# Patient Record
Sex: FEMALE | Race: WHITE | NOT HISPANIC OR LATINO | Employment: FULL TIME | URBAN - NONMETROPOLITAN AREA
[De-identification: names, ages, dates, MRNs, and addresses within clinical notes are randomized per-mention and may not be internally consistent; named-entity substitution may affect disease eponyms.]

---

## 2018-07-28 ENCOUNTER — APPOINTMENT (EMERGENCY)
Dept: RADIOLOGY | Facility: HOSPITAL | Age: 48
End: 2018-07-28

## 2018-07-28 ENCOUNTER — HOSPITAL ENCOUNTER (EMERGENCY)
Facility: HOSPITAL | Age: 48
Discharge: HOME | End: 2018-07-28
Admitting: EMERGENCY MEDICINE

## 2018-07-28 ENCOUNTER — APPOINTMENT (EMERGENCY)
Dept: CT IMAGING | Facility: HOSPITAL | Age: 48
End: 2018-07-28

## 2018-07-28 VITALS
RESPIRATION RATE: 18 BRPM | DIASTOLIC BLOOD PRESSURE: 61 MMHG | TEMPERATURE: 98.7 F | SYSTOLIC BLOOD PRESSURE: 132 MMHG | HEART RATE: 63 BPM | OXYGEN SATURATION: 98 % | WEIGHT: 186 LBS

## 2018-07-28 DIAGNOSIS — R07.9 CHEST PAIN: ICD-10-CM

## 2018-07-28 DIAGNOSIS — M54.2 NECK PAIN: Primary | ICD-10-CM

## 2018-07-28 LAB
ALBUMIN SERPL BCP-MCNC: 3.6 G/DL (ref 3.5–5)
ALP SERPL-CCNC: 96 U/L (ref 46–116)
ALT SERPL W P-5'-P-CCNC: 35 U/L (ref 12–78)
ANION GAP SERPL CALCULATED.3IONS-SCNC: 8 MMOL/L (ref 4–13)
APTT PPP: 28 SECONDS (ref 24–36)
AST SERPL W P-5'-P-CCNC: 20 U/L (ref 5–45)
BASOPHILS # BLD AUTO: 0.06 THOUSANDS/ΜL (ref 0–0.1)
BASOPHILS NFR BLD AUTO: 1 % (ref 0–1)
BILIRUB SERPL-MCNC: 0.3 MG/DL (ref 0.2–1)
BUN SERPL-MCNC: 8 MG/DL (ref 5–25)
CALCIUM SERPL-MCNC: 8.8 MG/DL (ref 8.3–10.1)
CHLORIDE SERPL-SCNC: 103 MMOL/L (ref 100–108)
CO2 SERPL-SCNC: 27 MMOL/L (ref 21–32)
CREAT SERPL-MCNC: 0.84 MG/DL (ref 0.6–1.3)
EOSINOPHIL # BLD AUTO: 0.15 THOUSAND/ΜL (ref 0–0.61)
EOSINOPHIL NFR BLD AUTO: 2 % (ref 0–6)
ERYTHROCYTE [DISTWIDTH] IN BLOOD BY AUTOMATED COUNT: 13.4 % (ref 11.6–15.1)
GFR SERPL CREATININE-BSD FRML MDRD: 84 ML/MIN/1.73SQ M
GLUCOSE SERPL-MCNC: 88 MG/DL (ref 65–140)
HCT VFR BLD AUTO: 41.6 % (ref 34.8–46.1)
HGB BLD-MCNC: 14.2 G/DL (ref 11.5–15.4)
INR PPP: 0.98 (ref 0.86–1.17)
LYMPHOCYTES # BLD AUTO: 2.62 THOUSANDS/ΜL (ref 0.6–4.47)
LYMPHOCYTES NFR BLD AUTO: 29 % (ref 14–44)
MAGNESIUM SERPL-MCNC: 2.1 MG/DL (ref 1.6–2.6)
MCH RBC QN AUTO: 29.2 PG (ref 26.8–34.3)
MCHC RBC AUTO-ENTMCNC: 34.1 G/DL (ref 31.4–37.4)
MCV RBC AUTO: 85 FL (ref 82–98)
MONOCYTES # BLD AUTO: 0.67 THOUSAND/ΜL (ref 0.17–1.22)
MONOCYTES NFR BLD AUTO: 7 % (ref 4–12)
NEUTROPHILS # BLD AUTO: 5.59 THOUSANDS/ΜL (ref 1.85–7.62)
NEUTS SEG NFR BLD AUTO: 61 % (ref 43–75)
PLATELET # BLD AUTO: 276 THOUSANDS/UL (ref 149–390)
PMV BLD AUTO: 11.1 FL (ref 8.9–12.7)
POTASSIUM SERPL-SCNC: 3.8 MMOL/L (ref 3.5–5.3)
PROT SERPL-MCNC: 7.6 G/DL (ref 6.4–8.2)
PROTHROMBIN TIME: 12.5 SECONDS (ref 11.8–14.2)
RBC # BLD AUTO: 4.87 MILLION/UL (ref 3.81–5.12)
SODIUM SERPL-SCNC: 138 MMOL/L (ref 136–145)
TROPONIN I SERPL-MCNC: <0.02 NG/ML
TSH SERPL DL<=0.05 MIU/L-ACNC: 4.33 UIU/ML (ref 0.36–3.74)
WBC # BLD AUTO: 9.09 THOUSAND/UL (ref 4.31–10.16)

## 2018-07-28 PROCEDURE — 85025 COMPLETE CBC W/AUTO DIFF WBC: CPT | Performed by: PHYSICIAN ASSISTANT

## 2018-07-28 PROCEDURE — 99285 EMERGENCY DEPT VISIT HI MDM: CPT

## 2018-07-28 PROCEDURE — 84484 ASSAY OF TROPONIN QUANT: CPT | Performed by: PHYSICIAN ASSISTANT

## 2018-07-28 PROCEDURE — 93005 ELECTROCARDIOGRAM TRACING: CPT

## 2018-07-28 PROCEDURE — 71046 X-RAY EXAM CHEST 2 VIEWS: CPT

## 2018-07-28 PROCEDURE — 96360 HYDRATION IV INFUSION INIT: CPT

## 2018-07-28 PROCEDURE — 83735 ASSAY OF MAGNESIUM: CPT | Performed by: PHYSICIAN ASSISTANT

## 2018-07-28 PROCEDURE — 80053 COMPREHEN METABOLIC PANEL: CPT | Performed by: PHYSICIAN ASSISTANT

## 2018-07-28 PROCEDURE — 70450 CT HEAD/BRAIN W/O DYE: CPT

## 2018-07-28 PROCEDURE — 85730 THROMBOPLASTIN TIME PARTIAL: CPT | Performed by: PHYSICIAN ASSISTANT

## 2018-07-28 PROCEDURE — 85610 PROTHROMBIN TIME: CPT | Performed by: PHYSICIAN ASSISTANT

## 2018-07-28 PROCEDURE — 84443 ASSAY THYROID STIM HORMONE: CPT | Performed by: PHYSICIAN ASSISTANT

## 2018-07-28 PROCEDURE — 36415 COLL VENOUS BLD VENIPUNCTURE: CPT | Performed by: PHYSICIAN ASSISTANT

## 2018-07-28 RX ORDER — CYCLOBENZAPRINE HCL 5 MG
5 TABLET ORAL 2 TIMES DAILY PRN
Qty: 15 TABLET | Refills: 0 | Status: SHIPPED | OUTPATIENT
Start: 2018-07-28 | End: 2021-03-24 | Stop reason: SDUPTHER

## 2018-07-28 RX ORDER — SODIUM CHLORIDE 9 MG/ML
125 INJECTION, SOLUTION INTRAVENOUS CONTINUOUS
Status: DISCONTINUED | OUTPATIENT
Start: 2018-07-28 | End: 2018-07-28 | Stop reason: HOSPADM

## 2018-07-28 RX ORDER — ASPIRIN 81 MG/1
324 TABLET, CHEWABLE ORAL ONCE
Status: COMPLETED | OUTPATIENT
Start: 2018-07-28 | End: 2018-07-28

## 2018-07-28 RX ADMIN — SODIUM CHLORIDE 125 ML/HR: 0.9 INJECTION, SOLUTION INTRAVENOUS at 18:25

## 2018-07-28 RX ADMIN — ASPIRIN 81 MG 324 MG: 81 TABLET ORAL at 18:25

## 2018-07-28 NOTE — ED PROVIDER NOTES
History  Chief Complaint   Patient presents with    Chest Pain     Patient states starting Thursday () she had left sided chest tightness going into her left arm  Patient states she gets numbess in her left shoulder and left side of her jaw  Patient presents to the emergency department today for evaluation left upper chest pain radiating into the left shoulder  She also complains of numbness of the left side of the upper chest into the left side of the jaw  The symptoms have been intermittent over the last 48 hours  No history of back pain  She denies abdominal pain nausea or vomiting  She denies any leg pain or leg edema  No history of cough or fever  Patient provides vague history of a negative cardiac workup with in a year or 2  None       Past Medical History:   Diagnosis Date    Disease of thyroid gland     Hypo       Past Surgical History:   Procedure Laterality Date     SECTION         History reviewed  No pertinent family history  I have reviewed and agree with the history as documented  Social History   Substance Use Topics    Smoking status: Former Smoker    Smokeless tobacco: Never Used    Alcohol use Yes      Comment: Little        Review of Systems   Constitutional: Negative  HENT: Negative  Eyes: Negative  Respiratory: Negative  Negative for apnea, cough, choking, chest tightness, shortness of breath, wheezing and stridor  Cardiovascular: Positive for chest pain  Negative for palpitations and leg swelling  Gastrointestinal: Negative  Endocrine: Negative  Genitourinary: Negative  Musculoskeletal: Negative  Skin: Negative  Allergic/Immunologic: Negative  Neurological: Positive for numbness  Negative for dizziness, tremors, seizures, syncope, facial asymmetry, speech difficulty, weakness, light-headedness and headaches  Hematological: Negative  Psychiatric/Behavioral: Negative      All other systems reviewed and are negative  Physical Exam  Physical Exam   Constitutional: She is oriented to person, place, and time  She appears well-developed and well-nourished  No distress  HENT:   Head: Normocephalic  Right Ear: External ear normal    Left Ear: External ear normal    Nose: Nose normal    Mouth/Throat: Oropharynx is clear and moist  No oropharyngeal exudate  Eyes: Conjunctivae and EOM are normal  Pupils are equal, round, and reactive to light  Neck: Normal range of motion  Cardiovascular: Normal rate, regular rhythm, normal heart sounds and intact distal pulses  Exam reveals no gallop and no friction rub  No murmur heard  Pulmonary/Chest: Effort normal and breath sounds normal  No respiratory distress  She has no wheezes  She has no rales  She exhibits no tenderness  Abdominal: Soft  She exhibits no distension and no mass  There is no tenderness  There is no rebound and no guarding  No hernia  Musculoskeletal: Normal range of motion  Neurological: She is alert and oriented to person, place, and time  Skin: Skin is warm  Capillary refill takes less than 2 seconds  She is not diaphoretic  Psychiatric: She has a normal mood and affect  Vitals reviewed        Vital Signs  ED Triage Vitals   Temperature Pulse Respirations Blood Pressure SpO2   07/28/18 1807 07/28/18 1807 07/28/18 1807 07/28/18 1807 07/28/18 1807   98 7 °F (37 1 °C) 74 16 149/87 99 %      Temp Source Heart Rate Source Patient Position - Orthostatic VS BP Location FiO2 (%)   07/28/18 1807 07/28/18 1807 07/28/18 1807 07/28/18 1807 --   Temporal Monitor Sitting Right arm       Pain Score       07/28/18 1810       6           Vitals:    07/28/18 1807 07/28/18 1830   BP: 149/87    Pulse: 74 62   Patient Position - Orthostatic VS: Sitting        Visual Acuity      ED Medications  Medications   sodium chloride 0 9 % infusion (125 mL/hr Intravenous New Bag 7/28/18 1825)   aspirin chewable tablet 324 mg (324 mg Oral Given 7/28/18 1825) Diagnostic Studies  Results Reviewed     Procedure Component Value Units Date/Time    Comprehensive metabolic panel [[id number]] Collected:  07/28/18 1818    Lab Status:  Final result Specimen:  Blood from Arm, Right Updated:  07/28/18 1849     Sodium 138 mmol/L      Potassium 3 8 mmol/L      Chloride 103 mmol/L      CO2 27 mmol/L      Anion Gap 8 mmol/L      BUN 8 mg/dL      Creatinine 0 84 mg/dL      Glucose 88 mg/dL      Calcium 8 8 mg/dL      AST 20 U/L      ALT 35 U/L      Alkaline Phosphatase 96 U/L      Total Protein 7 6 g/dL      Albumin 3 6 g/dL      Total Bilirubin 0 30 mg/dL      eGFR 84 ml/min/1 73sq m     Narrative:         National Kidney Disease Education Program recommendations are as follows:  GFR calculation is accurate only with a steady state creatinine  Chronic Kidney disease less than 60 ml/min/1 73 sq  meters  Kidney failure less than 15 ml/min/1 73 sq  meters  TSH [[id number]]  (Abnormal) Collected:  07/28/18 1818    Lab Status:  Final result Specimen:  Blood from Arm, Right Updated:  07/28/18 1849     TSH 3RD GENERATON 4 326 (H) uIU/mL     Narrative:         Patients undergoing fluorescein dye angiography may retain small amounts of fluorescein in the body for 48-72 hours post procedure  Samples containing fluorescein can produce falsely depressed TSH values  If the patient had this procedure,a specimen should be resubmitted post fluorescein clearance            The recommended reference ranges for TSH during pregnancy are as follows:  First trimester 0 1 to 2 5 uIU/mL  Second trimester  0 2 to 3 0 uIU/mL  Third trimester 0 3 to 3 0 uIU/m      Magnesium [[id number]]  (Normal) Collected:  07/28/18 1818    Lab Status:  Final result Specimen:  Blood from Arm, Right Updated:  07/28/18 1849     Magnesium 2 1 mg/dL     Troponin I [[id number]]  (Normal) Collected:  07/28/18 1818    Lab Status:  Final result Specimen:  Blood from Arm, Right Updated:  07/28/18 1843     Troponin I <0 02 ng/mL Protime-INR [[id number]]  (Normal) Collected:  07/28/18 1818    Lab Status:  Final result Specimen:  Blood from Arm, Right Updated:  07/28/18 1836     Protime 12 5 seconds      INR 0 98    APTT [[id number]]  (Normal) Collected:  07/28/18 1818    Lab Status:  Final result Specimen:  Blood from Arm, Right Updated:  07/28/18 1836     PTT 28 seconds     CBC and differential [[id number]]  (Normal) Collected:  07/28/18 1818    Lab Status:  Final result Specimen:  Blood from Arm, Right Updated:  07/28/18 1825     WBC 9 09 Thousand/uL      RBC 4 87 Million/uL      Hemoglobin 14 2 g/dL      Hematocrit 41 6 %      MCV 85 fL      MCH 29 2 pg      MCHC 34 1 g/dL      RDW 13 4 %      MPV 11 1 fL      Platelets 899 Thousands/uL      Neutrophils Relative 61 %      Lymphocytes Relative 29 %      Monocytes Relative 7 %      Eosinophils Relative 2 %      Basophils Relative 1 %      Neutrophils Absolute 5 59 Thousands/µL      Lymphocytes Absolute 2 62 Thousands/µL      Monocytes Absolute 0 67 Thousand/µL      Eosinophils Absolute 0 15 Thousand/µL      Basophils Absolute 0 06 Thousands/µL                  XR chest 2 views   ED Interpretation by Mei Anderson PA-C (07/28 1926)   No evidence of acute cardiopulmonary process      CT head without contrast   Final Result by Torie Cheatham MD (07/28 1852)      No acute intracranial abnormality  Workstation performed: [id number]                    Procedures  Procedures       Phone Contacts  ED Phone Contact    ED Course  ED Course as of Jul 28 1940   Sat Jul 28, 2018   1810 Blood Pressure: 149/87   1810 Temperature: 98 7 °F (37 1 °C)   1810 Pulse: 74   1810 Respirations: 16   1810 SpO2: 99 %   1825 WBC: 9 09   1825 Hemoglobin: 14 2   1825 Platelets: 642   8528 Troponin I: <0 02   1845 INR: 0 98   1845 PTT: 28   1853 Magnesium: 2 1   1921 FINDINGS:    PARENCHYMA:  No intracranial mass, mass effect or midline shift   No CT signs of acute infarction   No acute parenchymal hemorrhage  VENTRICLES AND EXTRA-AXIAL SPACES:  Normal for the patient's age  VISUALIZED ORBITS AND PARANASAL SINUSES:  Unremarkable  CALVARIUM AND EXTRACRANIAL SOFT TISSUES:  Normal   Impression       No acute intracranial abnormality  1936 Patient was re-evaluated again at 1930 hours  She feels improvement  Laboratory as well as radiologic workup is normal   Patient is stable for discharge  Recommend treating for muscle related issues with low dose cyclobenzaprine as well as heat in the area  Patient to follow up with primary care  HEART Risk Score      Most Recent Value   History  0 Filed at: 07/28/2018 1820   ECG  0 Filed at: 07/28/2018 1820   Age  1 Filed at: 07/28/2018 1820   Risk Factors  1 Filed at: 07/28/2018 1820   Troponin  0 Filed at: 07/28/2018 1820   Heart Score Risk Calculator   History  0 Filed at: 07/28/2018 1820   ECG  0 Filed at: 07/28/2018 1820   Age  1 Filed at: 07/28/2018 1820   Risk Factors  1 Filed at: 07/28/2018 1820   Troponin  0 Filed at: 07/28/2018 1820   HEART Score  2 Filed at: 07/28/2018 1820   HEART Score  2 Filed at: 07/28/2018 1820                            MDM  CritCare Time    Disposition  Final diagnoses:   Neck pain   Chest pain     Time reflects when diagnosis was documented in both MDM as applicable and the Disposition within this note     Time User Action Codes Description Comment    7/28/2018  7:37 PM Rosalia Likes Add [M54 2] Neck pain     7/28/2018  7:38 PM Rosalia Likes Add [R07 9] Chest pain       ED Disposition     ED Disposition Condition Comment    Discharge  [facility] discharge to home/self care      Condition at discharge: Good        Follow-up Information     Follow up With Specialties Details Why Contact Info Additional [address] [facility] Emergency Department Emergency Medicine  If symptoms worsen Efraín Gottlieb 1946  [phone] [facility], [address], [address] Silver, DO Family Medicine Schedule an appointment as soon as possible for a visit  [address] [address]  [phone]             Patient's Medications   Discharge Prescriptions    CYCLOBENZAPRINE (FLEXERIL) 5 MG TABLET    Take 1 tablet (5 mg total) by mouth 2 (two) times a day as needed for muscle spasms       Start Date: 7/28/2018 End Date: --       Order Dose: 5 mg       Quantity: 15 tablet    Refills: 0     No discharge procedures on file      ED Provider  Electronically Signed by           Sara Wiley PA-C  07/28/18 2917

## 2018-07-28 NOTE — DISCHARGE INSTRUCTIONS
Acute Neck Pain   WHAT YOU NEED TO KNOW:   Acute neck pain starts suddenly, increases quickly, and goes away in a few days  The pain may come and go, or be worse with certain movements  The pain may be only in your neck, or it may move to your arms, back, or shoulders  You may also have pain that starts in another body area and moves to your neck  DISCHARGE INSTRUCTIONS:   Return to the emergency department if:   · You have an injury that causes neck pain and shooting pain down your arms or legs  · Your neck pain suddenly becomes severe  · You have neck pain along with numbness, tingling, or weakness in your arms or legs  · You have a stiff neck, a headache, and a fever  Contact your healthcare provider if:   · You have new or worsening symptoms  · Your symptoms continue even after treatment  · You have questions or concerns about your condition or care  Medicines:   · NSAIDs , such as ibuprofen, help decrease swelling, pain, and fever  This medicine is available without a doctor's order  Ask your healthcare provider which medicine to take and how often to take it  Follow directions  NSAIDs can cause stomach bleeding or kidney problems if not taken correctly  If you take blood thinner medicine, always ask if NSAIDs are safe for you  · Acetaminophen  helps decrease pain and fever  Ask your healthcare provider how much to take and how often to take it  Follow directions  Acetaminophen can cause liver damage if not taken correctly  · Steroid medicine  may be used to reduce inflammation  This can help relieve pain caused by swelling  · Take your medicine as directed  Contact your healthcare provider if you think your medicine is not helping or if you have side effects  Tell him or her if you are allergic to any medicine  Keep a list of the medicines, vitamins, and herbs you take  Include the amounts, and when and why you take them  Bring the list or the pill bottles to follow-up visits  Carry your medicine list with you in case of an emergency  Manage or prevent acute neck pain:   · Rest your neck as directed  Do not make sudden movements, such as turning your head quickly  Your healthcare provider may recommend you wear a cervical collar for a short time  The collar will prevent you from moving your head  This will give your neck time to heal if an injury is causing your neck pain  Ask your healthcare provider when you can return to sports or other normal daily activities  · Apply heat as directed  Heat helps relieve pain and swelling  Use a heat wrap, or soak a small towel in warm water  Wring out the extra water  Apply the heat wrap or towel for 20 minutes every hour, or as directed  · Apply ice as directed  Ice helps relieve pain and swelling, and can help prevent tissue damage  Use an ice pack, or put ice in a bag  Cover the ice pack or back with a towel before you apply it to your neck  Apply the ice pack or ice for 15 minutes every hour, or as directed  Your healthcare provider can tell you how often to apply ice  · Do neck exercises as directed  Neck exercises help strengthen the muscles and increase range of motion  Your healthcare provider will tell you which exercises are right for you  He may give you instructions, or he may recommend that you work with a physical therapist  Your healthcare provider or therapist can make sure you are doing the exercises correctly  · Maintain good posture  Try to keep your head and shoulders lifted when you sit  If you work in front of a computer, make sure the monitor is at the right level  You should not need to look up down to see the screen  You should also not have to lean forward to be able to read what is on the screen  Make sure your keyboard, mouse, and other computer items are placed where you do not have to extend your shoulder to reach them  Get up often if you work in front of a computer or sit for long periods of time  Stretch or walk around to keep your neck muscles loose  Follow up with your healthcare provider as directed: Your healthcare provider may refer you to a specialist if your pain does not get better with treatment  Write down your questions so you remember to ask them during your visits  © 2017 [address] [employer] Information is for End User's use only and may not be sold, redistributed or otherwise used for commercial purposes  All illustrations and images included in CareNotes® are the copyrighted property of A D A M , Inc  or Scotty Thomas  The above information is an  only  It is not intended as medical advice for individual conditions or treatments  Talk to your doctor, nurse or pharmacist before following any medical regimen to see if it is safe and effective for you

## 2018-07-28 NOTE — ED PROCEDURE NOTE
Procedure  ECG 12 Lead Documentation  Date/Time: 7/28/2018 6:20 PM  Performed by: Roma Greenfield  Authorized by: Roma Greenfield     Indications / Diagnosis:  Chest pain  ECG reviewed by me, the ED Provider: yes    Patient location:  ED  Previous ECG:     Comparison to cardiac monitor: Yes    Interpretation:     Interpretation: normal    Rate:     ECG rate:  72    ECG rate assessment: normal    Rhythm:     Rhythm: sinus rhythm    Ectopy:     Ectopy: none    QRS:     QRS axis:  Normal    QRS intervals:  Normal  Conduction:     Conduction: normal    ST segments:     ST segments:  Normal  T waves:     T waves: normal                       Sharif Hoyt PA-C  07/28/18 1820

## 2018-07-31 LAB
ATRIAL RATE: 72 BPM
P AXIS: 42 DEGREES
PR INTERVAL: 152 MS
QRS AXIS: 24 DEGREES
QRSD INTERVAL: 80 MS
QT INTERVAL: 420 MS
QTC INTERVAL: 459 MS
T WAVE AXIS: 32 DEGREES
VENTRICULAR RATE: 72 BPM

## 2018-07-31 PROCEDURE — 93010 ELECTROCARDIOGRAM REPORT: CPT | Performed by: INTERNAL MEDICINE

## 2019-11-01 ENCOUNTER — APPOINTMENT (OUTPATIENT)
Dept: LAB | Facility: CLINIC | Age: 49
End: 2019-11-01

## 2019-11-01 ENCOUNTER — APPOINTMENT (OUTPATIENT)
Dept: URGENT CARE | Facility: CLINIC | Age: 49
End: 2019-11-01

## 2019-11-01 ENCOUNTER — TRANSCRIBE ORDERS (OUTPATIENT)
Dept: ADMINISTRATIVE | Facility: HOSPITAL | Age: 49
End: 2019-11-01

## 2019-11-01 DIAGNOSIS — Z02.1 PRE-EMPLOYMENT HEALTH SCREENING EXAMINATION: Primary | ICD-10-CM

## 2019-11-01 DIAGNOSIS — Z02.1 PRE-EMPLOYMENT HEALTH SCREENING EXAMINATION: ICD-10-CM

## 2019-11-01 LAB
HBV SURFACE AB SER-ACNC: 52.18 MIU/ML
RUBV IGG SERPL IA-ACNC: 27.1 IU/ML

## 2019-11-01 PROCEDURE — 86735 MUMPS ANTIBODY: CPT

## 2019-11-01 PROCEDURE — 86480 TB TEST CELL IMMUN MEASURE: CPT

## 2019-11-01 PROCEDURE — 86706 HEP B SURFACE ANTIBODY: CPT

## 2019-11-01 PROCEDURE — 86762 RUBELLA ANTIBODY: CPT

## 2019-11-01 PROCEDURE — 86765 RUBEOLA ANTIBODY: CPT

## 2019-11-01 PROCEDURE — 36415 COLL VENOUS BLD VENIPUNCTURE: CPT

## 2019-11-01 PROCEDURE — 86787 VARICELLA-ZOSTER ANTIBODY: CPT

## 2019-11-04 LAB
GAMMA INTERFERON BACKGROUND BLD IA-ACNC: 0.04 IU/ML
M TB IFN-G BLD-IMP: NEGATIVE
M TB IFN-G CD4+ BCKGRND COR BLD-ACNC: -0.01 IU/ML
M TB IFN-G CD4+ BCKGRND COR BLD-ACNC: -0.01 IU/ML
MITOGEN IGNF BCKGRD COR BLD-ACNC: >10 IU/ML

## 2019-11-05 LAB
MEV IGG SER QL: NORMAL
MUV IGG SER QL: NORMAL
VZV IGG SER IA-ACNC: NORMAL

## 2020-09-18 RX ORDER — MULTIVITAMIN
TABLET ORAL
COMMUNITY

## 2020-09-25 ENCOUNTER — OFFICE VISIT (OUTPATIENT)
Dept: FAMILY MEDICINE CLINIC | Facility: CLINIC | Age: 50
End: 2020-09-25
Payer: COMMERCIAL

## 2020-09-25 VITALS
HEIGHT: 62 IN | DIASTOLIC BLOOD PRESSURE: 76 MMHG | HEART RATE: 68 BPM | OXYGEN SATURATION: 98 % | SYSTOLIC BLOOD PRESSURE: 116 MMHG | WEIGHT: 166.6 LBS | BODY MASS INDEX: 30.66 KG/M2

## 2020-09-25 DIAGNOSIS — R53.83 OTHER FATIGUE: ICD-10-CM

## 2020-09-25 DIAGNOSIS — E03.9 ACQUIRED HYPOTHYROIDISM: ICD-10-CM

## 2020-09-25 DIAGNOSIS — Z00.00 ANNUAL PHYSICAL EXAM: Primary | ICD-10-CM

## 2020-09-25 DIAGNOSIS — Z12.31 ENCOUNTER FOR SCREENING MAMMOGRAM FOR BREAST CANCER: ICD-10-CM

## 2020-09-25 DIAGNOSIS — L02.91 ABSCESS: ICD-10-CM

## 2020-09-25 DIAGNOSIS — R79.89 LOW SERUM VITAMIN D: ICD-10-CM

## 2020-09-25 DIAGNOSIS — Z23 NEEDS FLU SHOT: ICD-10-CM

## 2020-09-25 PROCEDURE — 99386 PREV VISIT NEW AGE 40-64: CPT | Performed by: NURSE PRACTITIONER

## 2020-09-25 PROCEDURE — 90686 IIV4 VACC NO PRSV 0.5 ML IM: CPT | Performed by: NURSE PRACTITIONER

## 2020-09-25 PROCEDURE — 90471 IMMUNIZATION ADMIN: CPT | Performed by: NURSE PRACTITIONER

## 2020-09-25 RX ORDER — LEVOTHYROXINE SODIUM 0.03 MG/1
TABLET ORAL
COMMUNITY
Start: 2020-09-08 | End: 2020-12-30 | Stop reason: SDUPTHER

## 2020-09-25 RX ORDER — CEPHALEXIN 500 MG/1
500 CAPSULE ORAL EVERY 8 HOURS SCHEDULED
Qty: 21 CAPSULE | Refills: 0 | Status: SHIPPED | OUTPATIENT
Start: 2020-09-25 | End: 2020-10-02

## 2020-09-25 NOTE — PATIENT INSTRUCTIONS

## 2020-09-25 NOTE — PROGRESS NOTES
[facility] [address] [place] [address] PR[facility] CARE    NAME: Татьяна Parr  AGE: 51 y o  SEX: female  : 1970     DATE: 2020     Assessment and Plan:     Problem List Items Addressed This Visit     None      Visit Diagnoses     Annual physical exam    -  Primary    Relevant Orders    Lipid panel    Comprehensive metabolic panel    HEMOGLOBIN A1C W/ EAG ESTIMATION    Acquired hypothyroidism        Relevant Medications    levothyroxine 25 mcg tablet    Other Relevant Orders    TSH, 3rd generation    Encounter for screening mammogram for breast cancer        Relevant Orders    Mammo screening bilateral w 3d & cad    Needs flu shot        Relevant Orders    influenza vaccine, quadrivalent, 0 5 mL, preservative-free, for adult and pediatric patients 6 mos+ (AFLURIA, FLUARIX, FLULAVAL, FLUZONE) (Completed)    Low serum vitamin D        Relevant Orders    Vitamin D 25 hydroxy    Other fatigue        Relevant Orders    TSH, 3rd generation    Vitamin D 25 hydroxy    Abscess        Relevant Medications    cephalexin (KEFLEX) 500 mg capsule          Immunizations and preventive care screenings were discussed with patient today  Appropriate education was printed on patient's after visit summary  Counseling:  · Exercise: the importance of regular exercise/physical activity was discussed  Recommend exercise 3-5 times per week for at least 30 minutes  BMI Counseling: Body mass index is 30 47 kg/m²  The BMI is above normal  Nutrition recommendations include encouraging healthy choices of fruits and vegetables  Exercise recommendations include moderate physical activity 150 minutes/week  Blood work ordered to assess thyroid level and vitamin D   Screening blood work also ordered    Will have her complete a course of Keflex for her abscess on her back - it is drained but there is a small "opening" there from the abscess that needs to heal       Return in 6 months (on 3/25/2021)  Chief Complaint:     Chief Complaint   Patient presents with    Annual Exam    Thyroid Problem     would like blood work     Acne     on back, little hole on back, would like to know if she needs a antibiotic       History of Present Illness:     Adult Annual Physical   Patient here for a comprehensive physical exam  The patient reports problems - Recent painful area on back that expanded in size that was more than likely an abscess - she drained it herself but there is now a "hole" there and she is unsure if she needs abx therapy or not  Reports she also got "burn" around the area from the band aid she used with the abx ointment  Also with a hx of low vitamin D and has been feeling more tired lately  Is no longer taking Vitamin D supplements anymore  Diet and Physical Activity  · Diet/Nutrition: well balanced diet  · Exercise: walking  Depression Screening  PHQ-9 Depression Screening    PHQ-9:    Frequency of the following problems over the past two weeks:       Little interest or pleasure in doing things:  0 - not at all  Feeling down, depressed, or hopeless:  0 - not at all  PHQ-2 Score:  0       General Health  · Sleep: sleeps well  · Hearing: normal - bilateral   · Vision: no vision problems, most recent eye exam <1 year ago and wears glasses  · Dental: no dental visits for >1 year and brushes teeth twice daily  /GYN Health  · Patient is: premenopausal  · Last menstrual period: monthly  · Contraceptive method: none  Review of Systems:     Review of Systems   Constitutional: Negative  Cardiovascular: Negative  Skin:        Please see HPI  Neurological: Negative         Past Medical History:     Past Medical History:   Diagnosis Date    Disease of thyroid gland     Hypo      Past Surgical History:     Past Surgical History:   Procedure Laterality Date     SECTION        Social History:        Social History     Socioeconomic History    Marital status: Single     Spouse name: None    Number of children: None    Years of education: None    Highest education level: None   Occupational History    None   Social Needs    Financial resource strain: None    Food insecurity     Worry: None     Inability: None    Transportation needs     Medical: None     Non-medical: None   Tobacco Use    Smoking status: Former Smoker    Smokeless tobacco: Never Used   Substance and Sexual Activity    Alcohol use: Yes     Frequency: Monthly or less     Drinks per session: 1 or 2     Binge frequency: Never     Comment: Little    Drug use: No    Sexual activity: None   Lifestyle    Physical activity     Days per week: None     Minutes per session: None    Stress: None   Relationships    Social connections     Talks on phone: None     Gets together: None     Attends Scientologist service: None     Active member of club or organization: None     Attends meetings of clubs or organizations: None     Relationship status: None    Intimate partner violence     Fear of current or ex partner: None     Emotionally abused: None     Physically abused: None     Forced sexual activity: None   Other Topics Concern    None   Social History Narrative    None      Family History:     Family History   Problem Relation Age of Onset    No Known Problems Mother     No Known Problems Father       Current Medications:     Current Outpatient Medications   Medication Sig Dispense Refill    levothyroxine 25 mcg tablet TAKE 1 TABLET BY MOUTH ONCE DAILY AT  LEAST  30  MINUTES  PRIOR  TO  BREAKFAST  OR  OTHER  MEDS      Multiple Vitamin (Multi-Vitamin Daily) TABS Take by mouth      cephalexin (KEFLEX) 500 mg capsule Take 1 capsule (500 mg total) by mouth every 8 (eight) hours for 7 days 21 capsule 0    cyclobenzaprine (FLEXERIL) 5 mg tablet Take 1 tablet (5 mg total) by mouth 2 (two) times a day as needed for muscle spasms (Patient not taking: Reported on 9/25/2020) 15 tablet 0     No current facility-administered medications for this visit  Allergies:     No Known Allergies   Physical Exam:     /76 (BP Location: Left arm, Patient Position: Sitting, Cuff Size: Standard)   Pulse 68   Ht 5' 2" (1 575 m)   Wt 75 6 kg (166 lb 9 6 oz)   SpO2 98%   BMI 30 47 kg/m²     Physical Exam  Constitutional:       Appearance: Normal appearance  HENT:      Head: Normocephalic and atraumatic  Right Ear: Tympanic membrane, ear canal and external ear normal       Left Ear: Tympanic membrane, ear canal and external ear normal       Nose: Nose normal    Eyes:      Extraocular Movements: Extraocular movements intact  Conjunctiva/sclera: Conjunctivae normal       Pupils: Pupils are equal, round, and reactive to light  Neck:      Musculoskeletal: Normal range of motion and neck supple  Cardiovascular:      Rate and Rhythm: Normal rate and regular rhythm  Heart sounds: No murmur  No gallop  Pulmonary:      Effort: Pulmonary effort is normal  No respiratory distress  Breath sounds: Normal breath sounds  No wheezing or rales  Skin:     General: Skin is warm  Neurological:      General: No focal deficit present  Mental Status: She is alert and oriented to person, place, and time  Mental status is at baseline            Leonel Jackson [address]  PRIMARY CARE

## 2020-09-30 ENCOUNTER — LAB (OUTPATIENT)
Dept: LAB | Facility: HOSPITAL | Age: 50
End: 2020-09-30
Payer: COMMERCIAL

## 2020-09-30 DIAGNOSIS — E03.9 ACQUIRED HYPOTHYROIDISM: ICD-10-CM

## 2020-09-30 DIAGNOSIS — Z00.00 ANNUAL PHYSICAL EXAM: ICD-10-CM

## 2020-09-30 DIAGNOSIS — R79.89 LOW SERUM VITAMIN D: ICD-10-CM

## 2020-09-30 DIAGNOSIS — R53.83 OTHER FATIGUE: ICD-10-CM

## 2020-09-30 LAB
25(OH)D3 SERPL-MCNC: 25.2 NG/ML (ref 30–100)
ALBUMIN SERPL BCP-MCNC: 3.5 G/DL (ref 3.5–5)
ALP SERPL-CCNC: 88 U/L (ref 46–116)
ALT SERPL W P-5'-P-CCNC: 24 U/L (ref 12–78)
ANION GAP SERPL CALCULATED.3IONS-SCNC: 5 MMOL/L (ref 4–13)
AST SERPL W P-5'-P-CCNC: 15 U/L (ref 5–45)
BILIRUB SERPL-MCNC: 0.29 MG/DL (ref 0.2–1)
BUN SERPL-MCNC: 15 MG/DL (ref 5–25)
CALCIUM SERPL-MCNC: 8.7 MG/DL (ref 8.3–10.1)
CHLORIDE SERPL-SCNC: 104 MMOL/L (ref 100–108)
CHOLEST SERPL-MCNC: 180 MG/DL (ref 50–200)
CO2 SERPL-SCNC: 30 MMOL/L (ref 21–32)
CREAT SERPL-MCNC: 0.85 MG/DL (ref 0.6–1.3)
EST. AVERAGE GLUCOSE BLD GHB EST-MCNC: 103 MG/DL
GFR SERPL CREATININE-BSD FRML MDRD: 81 ML/MIN/1.73SQ M
GLUCOSE SERPL-MCNC: 85 MG/DL (ref 65–140)
HBA1C MFR BLD: 5.2 %
HDLC SERPL-MCNC: 52 MG/DL
LDLC SERPL CALC-MCNC: 106 MG/DL (ref 0–100)
NONHDLC SERPL-MCNC: 128 MG/DL
POTASSIUM SERPL-SCNC: 3.8 MMOL/L (ref 3.5–5.3)
PROT SERPL-MCNC: 7.4 G/DL (ref 6.4–8.2)
SODIUM SERPL-SCNC: 139 MMOL/L (ref 136–145)
TRIGL SERPL-MCNC: 109 MG/DL
TSH SERPL DL<=0.05 MIU/L-ACNC: 2.87 UIU/ML (ref 0.36–3.74)

## 2020-09-30 PROCEDURE — 82306 VITAMIN D 25 HYDROXY: CPT

## 2020-09-30 PROCEDURE — 83036 HEMOGLOBIN GLYCOSYLATED A1C: CPT

## 2020-09-30 PROCEDURE — 36415 COLL VENOUS BLD VENIPUNCTURE: CPT

## 2020-09-30 PROCEDURE — 80053 COMPREHEN METABOLIC PANEL: CPT

## 2020-09-30 PROCEDURE — 80061 LIPID PANEL: CPT

## 2020-09-30 PROCEDURE — 84443 ASSAY THYROID STIM HORMONE: CPT

## 2020-10-04 DIAGNOSIS — R79.89 LOW SERUM VITAMIN D: Primary | ICD-10-CM

## 2020-10-04 RX ORDER — ERGOCALCIFEROL 1.25 MG/1
50000 CAPSULE ORAL WEEKLY
Qty: 8 CAPSULE | Refills: 0 | Status: SHIPPED | OUTPATIENT
Start: 2020-10-04 | End: 2021-03-24

## 2020-11-06 ENCOUNTER — HOSPITAL ENCOUNTER (OUTPATIENT)
Dept: RADIOLOGY | Facility: CLINIC | Age: 50
Discharge: HOME | End: 2020-11-06
Payer: COMMERCIAL

## 2020-11-06 VITALS — BODY MASS INDEX: 30.55 KG/M2 | HEIGHT: 62 IN | WEIGHT: 166 LBS

## 2020-11-06 DIAGNOSIS — Z12.31 ENCOUNTER FOR SCREENING MAMMOGRAM FOR BREAST CANCER: ICD-10-CM

## 2020-11-06 PROCEDURE — 77063 BREAST TOMOSYNTHESIS BI: CPT

## 2020-11-06 PROCEDURE — 77067 SCR MAMMO BI INCL CAD: CPT

## 2020-12-28 ENCOUNTER — IMMUNIZATIONS (OUTPATIENT)
Dept: FAMILY MEDICINE CLINIC | Facility: HOSPITAL | Age: 50
End: 2020-12-28
Payer: COMMERCIAL

## 2020-12-28 DIAGNOSIS — Z23 ENCOUNTER FOR IMMUNIZATION: ICD-10-CM

## 2020-12-28 PROCEDURE — 0011A SARS-COV-2 / COVID-19 MRNA VACCINE (MODERNA) 100 MCG: CPT

## 2020-12-28 PROCEDURE — 91301 SARS-COV-2 / COVID-19 MRNA VACCINE (MODERNA) 100 MCG: CPT

## 2020-12-30 DIAGNOSIS — E03.9 ACQUIRED HYPOTHYROIDISM: Primary | ICD-10-CM

## 2020-12-30 RX ORDER — LEVOTHYROXINE SODIUM 0.03 MG/1
25 TABLET ORAL DAILY
Qty: 90 TABLET | Refills: 0 | Status: SHIPPED | OUTPATIENT
Start: 2020-12-30 | End: 2021-03-24 | Stop reason: SDUPTHER

## 2021-01-26 ENCOUNTER — IMMUNIZATIONS (OUTPATIENT)
Dept: FAMILY MEDICINE CLINIC | Facility: HOSPITAL | Age: 51
End: 2021-01-26

## 2021-01-26 DIAGNOSIS — Z23 ENCOUNTER FOR IMMUNIZATION: Primary | ICD-10-CM

## 2021-01-26 PROCEDURE — 0012A SARS-COV-2 / COVID-19 MRNA VACCINE (MODERNA) 100 MCG: CPT

## 2021-01-26 PROCEDURE — 91301 SARS-COV-2 / COVID-19 MRNA VACCINE (MODERNA) 100 MCG: CPT

## 2021-03-24 ENCOUNTER — OFFICE VISIT (OUTPATIENT)
Dept: FAMILY MEDICINE CLINIC | Facility: CLINIC | Age: 51
End: 2021-03-24
Payer: COMMERCIAL

## 2021-03-24 VITALS
OXYGEN SATURATION: 98 % | SYSTOLIC BLOOD PRESSURE: 104 MMHG | WEIGHT: 169 LBS | HEART RATE: 84 BPM | TEMPERATURE: 97.6 F | BODY MASS INDEX: 31.1 KG/M2 | DIASTOLIC BLOOD PRESSURE: 62 MMHG | HEIGHT: 62 IN

## 2021-03-24 DIAGNOSIS — R79.89 LOW SERUM VITAMIN D: ICD-10-CM

## 2021-03-24 DIAGNOSIS — E03.9 ACQUIRED HYPOTHYROIDISM: Primary | ICD-10-CM

## 2021-03-24 DIAGNOSIS — M54.2 NECK PAIN: ICD-10-CM

## 2021-03-24 DIAGNOSIS — R07.9 CHEST PAIN: ICD-10-CM

## 2021-03-24 PROCEDURE — 99213 OFFICE O/P EST LOW 20 MIN: CPT | Performed by: NURSE PRACTITIONER

## 2021-03-24 RX ORDER — MELATONIN
1000 DAILY
COMMUNITY

## 2021-03-24 RX ORDER — OMEGA-3-ACID ETHYL ESTERS 1 G/1
2 CAPSULE, LIQUID FILLED ORAL 2 TIMES DAILY
COMMUNITY

## 2021-03-24 RX ORDER — LEVOTHYROXINE SODIUM 0.03 MG/1
25 TABLET ORAL DAILY
Qty: 90 TABLET | Refills: 1 | Status: SHIPPED | OUTPATIENT
Start: 2021-03-24 | End: 2021-10-06 | Stop reason: SDUPTHER

## 2021-03-24 RX ORDER — CYCLOBENZAPRINE HCL 5 MG
5 TABLET ORAL 2 TIMES DAILY PRN
Qty: 15 TABLET | Refills: 1 | Status: SHIPPED | OUTPATIENT
Start: 2021-03-24 | End: 2021-03-24 | Stop reason: SDUPTHER

## 2021-03-24 RX ORDER — CYCLOBENZAPRINE HCL 5 MG
5 TABLET ORAL 2 TIMES DAILY PRN
Qty: 15 TABLET | Refills: 1 | Status: SHIPPED | OUTPATIENT
Start: 2021-03-24 | End: 2022-07-29

## 2021-03-24 NOTE — PATIENT INSTRUCTIONS
You may receive a survey in the mail, or by e-mail, please fill it out COMPLETELY, and let us know how we did! Please remember to sign up for your [employer] charlee to check you lab results, send us messages, and schedule appointments  If you have questions about the [employer] option, please call us! Thank you again for choosing [facility]!

## 2021-03-24 NOTE — PROGRESS NOTES
Assessment/Plan:       Diagnoses and all orders for this visit:    Acquired hypothyroidism  -     levothyroxine 25 mcg tablet; Take 1 tablet (25 mcg total) by mouth daily    Neck pain  -     Discontinue: cyclobenzaprine (FLEXERIL) 5 mg tablet; Take 1 tablet (5 mg total) by mouth 2 (two) times a day as needed for muscle spasms  -     cyclobenzaprine (FLEXERIL) 5 mg tablet; Take 1 tablet (5 mg total) by mouth 2 (two) times a day as needed for muscle spasms    Chest pain  -     Discontinue: cyclobenzaprine (FLEXERIL) 5 mg tablet; Take 1 tablet (5 mg total) by mouth 2 (two) times a day as needed for muscle spasms  -     cyclobenzaprine (FLEXERIL) 5 mg tablet; Take 1 tablet (5 mg total) by mouth 2 (two) times a day as needed for muscle spasms    Low serum vitamin D    Other orders  -     Cancel: HIV 1/2 Antigen/Antibody (4th Generation) w Reflex [facility]; Future  -     Cancel: Ambulatory referral to Obstetrics / Gynecology; Future  -     omega-3-acid ethyl esters (LOVAZA) 1 g capsule; Take 2 g by mouth 2 (two) times a day  -     cholecalciferol (VITAMIN D3) 1,000 units tablet; Take 1,000 Units by mouth daily      Medication refills provided  Continue with daily Vitamin D supplementation  Subjective:      Patient ID: Sebastien Wilder is a 53 y o  female  Here today for a follow-up  Reports no new issues  Does state of a hx of neck issues - was told after an MRI years ago that there was compression of the discs  States the neck pain in the am is not unbearable but she does take motrin at times for it  She also uses cyclobenzaprine when needed, needs a refill for this today  Hx of hypothyroidism - feels good on her current dosage of levothyroxine          The following portions of the patient's history were reviewed and updated as appropriate: allergies, current medications, past family history, past medical history, past social history, past surgical history and problem list     Review of Systems   Constitutional: Negative  Respiratory: Negative  Cardiovascular: Negative  Gastrointestinal: Negative  Musculoskeletal: Positive for neck pain  Neurological: Negative  All other systems reviewed and are negative  Objective:      /62 (BP Location: Left arm, Patient Position: Sitting, Cuff Size: Large)   Pulse 84   Temp 97 6 °F (36 4 °C)   Ht 5' 2" (1 575 m)   Wt 76 7 kg (169 lb)   SpO2 98%   BMI 30 91 kg/m²          Physical Exam  Constitutional:       Appearance: Normal appearance  HENT:      Head: Normocephalic and atraumatic  Neck:      Musculoskeletal: Neck supple  Thyroid: No thyromegaly  Cardiovascular:      Rate and Rhythm: Normal rate and regular rhythm  Heart sounds: No murmur  No gallop  Pulmonary:      Effort: Pulmonary effort is normal  No respiratory distress  Breath sounds: Normal breath sounds  No wheezing or rales  Neurological:      General: No focal deficit present  Mental Status: She is alert and oriented to person, place, and time  Mental status is at baseline  Psychiatric:         Mood and Affect: Mood normal          Behavior: Behavior normal          Thought Content:  Thought content normal          Judgment: Judgment normal

## 2021-08-16 ENCOUNTER — OFFICE VISIT (OUTPATIENT)
Dept: URGENT CARE | Facility: CLINIC | Age: 51
End: 2021-08-16
Payer: COMMERCIAL

## 2021-08-16 VITALS
HEIGHT: 62 IN | BODY MASS INDEX: 31.1 KG/M2 | OXYGEN SATURATION: 99 % | DIASTOLIC BLOOD PRESSURE: 81 MMHG | RESPIRATION RATE: 16 BRPM | WEIGHT: 169 LBS | TEMPERATURE: 98.7 F | SYSTOLIC BLOOD PRESSURE: 143 MMHG | HEART RATE: 77 BPM

## 2021-08-16 DIAGNOSIS — R42 VERTIGO: Primary | ICD-10-CM

## 2021-08-16 DIAGNOSIS — J06.9 VIRAL URI: ICD-10-CM

## 2021-08-16 PROCEDURE — G0382 LEV 3 HOSP TYPE B ED VISIT: HCPCS | Performed by: EMERGENCY MEDICINE

## 2021-08-16 RX ORDER — PREDNISONE 10 MG/1
TABLET ORAL
Qty: 27 TABLET | Refills: 0 | Status: SHIPPED | OUTPATIENT
Start: 2021-08-16 | End: 2021-09-29

## 2021-08-16 RX ORDER — IBUPROFEN 200 MG
TABLET ORAL EVERY 6 HOURS PRN
COMMUNITY

## 2021-08-16 RX ORDER — MECLIZINE HYDROCHLORIDE 25 MG/1
50 TABLET ORAL EVERY 8 HOURS PRN
Qty: 30 TABLET | Refills: 0 | Status: SHIPPED | OUTPATIENT
Start: 2021-08-16

## 2021-08-16 NOTE — LETTER
August 16, 2021     Patient: Leonides Krueger   YOB: 1970   Date of Visit: 8/16/2021       To Whom it May Concern:    Leonides Krueger was seen in my clinic on 8/16/2021  She may return to work on 08/17/2021  If you have any questions or concerns, please don't hesitate to call           Sincerely,          Kecia Lee MD        CC: No Recipients

## 2021-08-16 NOTE — PROGRESS NOTES
[address] [facility]        NAME: Jeanmarie Ritchie is a 53 y o  female  : 1970    MRN: [id number]  DATE: 2021  TIME: 4:27 PM    Assessment and Plan   Vertigo [R42]  1  Vertigo  Ambulatory referral to Physical Therapy    meclizine (ANTIVERT) 25 mg tablet   2  Viral URI  predniSONE 10 mg tablet         Patient Instructions     Patient Instructions   If you are diabetic you should adhere strictly to your diabetic diet and monitor blood sugar closely while on prednisone and you should discontinue the prednisone if blood sugar becomes significantly elevated  Avoid nonsteroidal anti-inflammatories like Advil or Aleve while on prednisone  Vertigo   WHAT YOU NEED TO KNOW:   Vertigo is a condition that causes you to feel dizzy  You may feel that you or everything around you is moving or spinning  You may also feel like you are being pulled down or toward your side  DISCHARGE INSTRUCTIONS:   Return to the emergency department if:   · You have a headache and a stiff neck  · You have shaking chills and a fever  · You vomit over and over with no relief  · You have blood, pus, or fluid coming out of your ears  · You are confused  Contact your healthcare provider if:   · Your symptoms do not get better with treatment  · You have questions about your condition or care  Medicines:   · Medicine  may be given to help relieve your symptoms  · Take your medicine as directed  Contact your healthcare provider if you think your medicine is not helping or if you have side effects  Tell him or her if you are allergic to any medicine  Keep a list of the medicines, vitamins, and herbs you take  Include the amounts, and when and why you take them  Bring the list or the pill bottles to follow-up visits  Carry your medicine list with you in case of an emergency  Manage your symptoms:   · Do not drive , walk without help, or operate heavy machinery when you are dizzy       · Move slowly when you move from one position to another position  Get up slowly from sitting or lying down  Sit or lie down right away if you feel dizzy  · Drink plenty of liquids  Liquids help prevent dehydration  Ask how much liquid to drink each day and which liquids are best for you  · Vestibular and balance rehabilitation therapy (VBRT)  is used to teach you exercises to improve your balance and strength  These exercises may help decrease your vertigo and improve your balance  Ask for more information about this therapy  Follow up with your healthcare provider as directed:  Write down your questions so you remember to ask them during your visits  © Copyright [employer] 2021 Information is for End User's use only and may not be sold, redistributed or otherwise used for commercial purposes  All illustrations and images included in CareNotes® are the copyrighted property of A D A M , Inc  or [facility] Dyer   The above information is an  only  It is not intended as medical advice for individual conditions or treatments  Talk to your doctor, nurse or pharmacist before following any medical regimen to see if it is safe and effective for you  Follow up with PCP in 3-5 days  Proceed to  ER if symptoms worsen  Chief Complaint     Chief Complaint   Patient presents with    Dizziness     started with dizziness and unsteady feeling 2 days ago  Took antivert without relief  Now thinks it is a sinus infection         History of Present Illness       Patient complains of positional vertigo for the past 2 days  She has long history of same and has been taking meclizine without relief  She also complains of nasal congestion and frontal sinus pressure for the past 2 days  Review of Systems   Review of Systems   Constitutional: Negative for activity change, chills, fatigue and fever  HENT: Positive for sinus pressure  Negative for congestion, hearing loss, sore throat and tinnitus  Eyes: Negative for photophobia  Respiratory: Negative for shortness of breath  Gastrointestinal: Positive for nausea and vomiting  Negative for abdominal pain and blood in stool  Endocrine: Negative for cold intolerance, heat intolerance, polydipsia, polyphagia and polyuria  Genitourinary: Negative for hematuria  Musculoskeletal: Negative for arthralgias, back pain, joint swelling, myalgias, neck pain and neck stiffness  Skin: Negative for color change, rash and wound  Neurological: Positive for dizziness  Negative for syncope, weakness, light-headedness and headaches           Current Medications       Current Outpatient Medications:     cholecalciferol (VITAMIN D3) 1,000 units tablet, Take 1,000 Units by mouth daily, Disp: , Rfl:     ibuprofen (MOTRIN) 200 mg tablet, Take by mouth every 6 (six) hours as needed for mild pain, Disp: , Rfl:     levothyroxine 25 mcg tablet, Take 1 tablet (25 mcg total) by mouth daily, Disp: 90 tablet, Rfl: 1    Multiple Vitamin (Multi-Vitamin Daily) TABS, Take by mouth, Disp: , Rfl:     omega-3-acid ethyl esters (LOVAZA) 1 g capsule, Take 2 g by mouth 2 (two) times a day, Disp: , Rfl:     cyclobenzaprine (FLEXERIL) 5 mg tablet, Take 1 tablet (5 mg total) by mouth 2 (two) times a day as needed for muscle spasms, Disp: 15 tablet, Rfl: 1    meclizine (ANTIVERT) 25 mg tablet, Take 2 tablets (50 mg total) by mouth every 8 (eight) hours as needed for dizziness, Disp: 30 tablet, Rfl: 0    predniSONE 10 mg tablet, Take once daily all days pills on this schedule 6- 6- 5- 4- 3- 2- 1, Disp: 27 tablet, Rfl: 0    Current Allergies     Allergies as of 08/16/2021    (No Known Allergies)            The following portions of the patient's history were reviewed and updated as appropriate: allergies, current medications, past family history, past medical history, past social history, past surgical history and problem list      Past Medical History:   Diagnosis Date    Allergic  Disease of thyroid gland     Hypo       Past Surgical History:   Procedure Laterality Date     SECTION         Family History   Problem Relation Age of Onset    COPD Mother     Heart disease Father     COPD Father     Thyroid disease unspecified Sister     No Known Problems Daughter     No Known Problems Maternal Grandmother     No Known Problems Maternal Grandfather     No Known Problems Paternal Grandmother     No Known Problems Paternal Grandfather     Diabetes Sister     No Known Problems Maternal Aunt     No Known Problems Maternal Aunt     No Known Problems Maternal Aunt     No Known Problems Paternal Aunt     No Known Problems Paternal Aunt     Breast cancer Neg Hx     Breast cancer additional onset Neg Hx     Colon cancer Neg Hx     Ovarian cancer Neg Hx     BRCA2 Positive Neg Hx     BRCA2 Negative Neg Hx     BRCA1 Positive Neg Hx     BRCA 1/2 Neg Hx     BRCA1 Negative Neg Hx     Endometrial cancer Neg Hx          Medications have been verified  Objective   /81   Pulse 77   Temp 98 7 °F (37 1 °C)   Resp 16   Ht 5' 2" (1 575 m)   Wt 76 7 kg (169 lb)   LMP 2021   SpO2 99%   BMI 30 91 kg/m²        Physical Exam     Physical Exam  Vitals and nursing note reviewed  Constitutional:       General: She is not in acute distress  Appearance: She is well-developed  HENT:      Head: Normocephalic and atraumatic  Right Ear: Tympanic membrane and external ear normal       Left Ear: Tympanic membrane and external ear normal       Nose: Mucosal edema and congestion present  Mouth/Throat:      Pharynx: Posterior oropharyngeal erythema present  No oropharyngeal exudate  Tonsils: No tonsillar abscesses  Cardiovascular:      Rate and Rhythm: Normal rate and regular rhythm  Pulmonary:      Effort: Pulmonary effort is normal  No respiratory distress  Breath sounds: No wheezing or rales     Musculoskeletal:      Cervical back: Neck supple  Skin:     General: Skin is warm and dry  Neurological:      Mental Status: She is alert and oriented to person, place, and time  Psychiatric:         Mood and Affect: Mood normal          Behavior: Behavior normal          Thought Content:  Thought content normal          Judgment: Judgment normal

## 2021-08-16 NOTE — PATIENT INSTRUCTIONS
If you are diabetic you should adhere strictly to your diabetic diet and monitor blood sugar closely while on prednisone and you should discontinue the prednisone if blood sugar becomes significantly elevated  Avoid nonsteroidal anti-inflammatories like Advil or Aleve while on prednisone  Vertigo   WHAT YOU NEED TO KNOW:   Vertigo is a condition that causes you to feel dizzy  You may feel that you or everything around you is moving or spinning  You may also feel like you are being pulled down or toward your side  DISCHARGE INSTRUCTIONS:   Return to the emergency department if:   · You have a headache and a stiff neck  · You have shaking chills and a fever  · You vomit over and over with no relief  · You have blood, pus, or fluid coming out of your ears  · You are confused  Contact your healthcare provider if:   · Your symptoms do not get better with treatment  · You have questions about your condition or care  Medicines:   · Medicine  may be given to help relieve your symptoms  · Take your medicine as directed  Contact your healthcare provider if you think your medicine is not helping or if you have side effects  Tell him or her if you are allergic to any medicine  Keep a list of the medicines, vitamins, and herbs you take  Include the amounts, and when and why you take them  Bring the list or the pill bottles to follow-up visits  Carry your medicine list with you in case of an emergency  Manage your symptoms:   · Do not drive , walk without help, or operate heavy machinery when you are dizzy  · Move slowly  when you move from one position to another position  Get up slowly from sitting or lying down  Sit or lie down right away if you feel dizzy  · Drink plenty of liquids  Liquids help prevent dehydration  Ask how much liquid to drink each day and which liquids are best for you      · Vestibular and balance rehabilitation therapy (VBRT)  is used to teach you exercises to improve your balance and strength  These exercises may help decrease your vertigo and improve your balance  Ask for more information about this therapy  Follow up with your healthcare provider as directed:  Write down your questions so you remember to ask them during your visits  © Copyright [employer] 2021 Information is for End User's use only and may not be sold, redistributed or otherwise used for commercial purposes  All illustrations and images included in CareNotes® are the copyrighted property of [employer]  or Corey Hernandez  The above information is an  only  It is not intended as medical advice for individual conditions or treatments  Talk to your doctor, nurse or pharmacist before following any medical regimen to see if it is safe and effective for you

## 2021-09-14 ENCOUNTER — APPOINTMENT (EMERGENCY)
Dept: RADIOLOGY | Facility: HOSPITAL | Age: 51
End: 2021-09-14
Payer: COMMERCIAL

## 2021-09-14 ENCOUNTER — HOSPITAL ENCOUNTER (EMERGENCY)
Facility: HOSPITAL | Age: 51
Discharge: HOME | End: 2021-09-14
Attending: EMERGENCY MEDICINE
Payer: COMMERCIAL

## 2021-09-14 ENCOUNTER — APPOINTMENT (EMERGENCY)
Dept: NON INVASIVE DIAGNOSTICS | Facility: HOSPITAL | Age: 51
End: 2021-09-14
Payer: COMMERCIAL

## 2021-09-14 VITALS
SYSTOLIC BLOOD PRESSURE: 144 MMHG | OXYGEN SATURATION: 100 % | HEIGHT: 62 IN | BODY MASS INDEX: 31.28 KG/M2 | HEART RATE: 61 BPM | WEIGHT: 170 LBS | RESPIRATION RATE: 20 BRPM | DIASTOLIC BLOOD PRESSURE: 62 MMHG | TEMPERATURE: 97.3 F

## 2021-09-14 DIAGNOSIS — M79.605 LEFT LEG PAIN: ICD-10-CM

## 2021-09-14 PROCEDURE — 72110 X-RAY EXAM L-2 SPINE 4/>VWS: CPT

## 2021-09-14 PROCEDURE — 99285 EMERGENCY DEPT VISIT HI MDM: CPT | Performed by: EMERGENCY MEDICINE

## 2021-09-14 PROCEDURE — 93971 EXTREMITY STUDY: CPT | Performed by: SURGERY

## 2021-09-14 PROCEDURE — 99284 EMERGENCY DEPT VISIT MOD MDM: CPT

## 2021-09-14 PROCEDURE — 93971 EXTREMITY STUDY: CPT

## 2021-09-14 RX ORDER — IBUPROFEN 600 MG/1
600 TABLET ORAL ONCE
Status: COMPLETED | OUTPATIENT
Start: 2021-09-14 | End: 2021-09-14

## 2021-09-14 RX ORDER — NAPROXEN 500 MG/1
500 TABLET ORAL 2 TIMES DAILY WITH MEALS
Qty: 30 TABLET | Refills: 0 | Status: SHIPPED | OUTPATIENT
Start: 2021-09-14 | End: 2021-09-29

## 2021-09-14 RX ORDER — PREDNISONE 20 MG/1
40 TABLET ORAL DAILY
Qty: 10 TABLET | Refills: 0 | Status: SHIPPED | OUTPATIENT
Start: 2021-09-14 | End: 2021-09-19

## 2021-09-14 RX ORDER — PREDNISONE 20 MG/1
40 TABLET ORAL ONCE
Status: COMPLETED | OUTPATIENT
Start: 2021-09-14 | End: 2021-09-14

## 2021-09-14 RX ORDER — METHOCARBAMOL 500 MG/1
500 TABLET, FILM COATED ORAL 2 TIMES DAILY
Qty: 20 TABLET | Refills: 0 | Status: SHIPPED | OUTPATIENT
Start: 2021-09-14 | End: 2021-09-29

## 2021-09-14 RX ADMIN — IBUPROFEN 600 MG: 600 TABLET ORAL at 08:37

## 2021-09-14 RX ADMIN — PREDNISONE 40 MG: 20 TABLET ORAL at 08:37

## 2021-09-14 NOTE — ED TRIAGE NOTES
Pt states she pulled a muscle in left leg about 2 weeks ago and it has progressively gotten worse   Pain 6/10

## 2021-09-14 NOTE — ED PROVIDER NOTES
History  Chief Complaint   Patient presents with    Leg Pain     states pulled muscle 2 weeks ago     Patient complains of pain in the left leg for the past 2 weeks  Getting progressively worse  Ice with some relief  Hurts to bear weight  No known trauma  No fevers or chills  No chest pain     No shortness of breath  No rash  No shortness of breath  History provided by:  Patient  Leg Pain  Lower extremity pain location: Left leg  Time since incident:  2 weeks  Pain details:     Quality:  Aching    Severity:  Mild    Onset quality:  Gradual    Duration:  2 weeks    Timing:  Constant    Progression:  Worsening  Chronicity:  New  Prior injury to area:  No  Relieved by:  Ice (Aspercreme)  Worsened by:  Bearing weight  Ineffective treatments:  None tried  Associated symptoms: no back pain, no fever, no neck pain and no numbness        Prior to Admission Medications   Prescriptions Last Dose Informant Patient Reported? Taking?    Multiple Vitamin (Multi-Vitamin Daily) TABS   Yes No   Sig: Take by mouth   cholecalciferol (VITAMIN D3) 1,000 units tablet   Yes No   Sig: Take 1,000 Units by mouth daily   cyclobenzaprine (FLEXERIL) 5 mg tablet   No No   Sig: Take 1 tablet (5 mg total) by mouth 2 (two) times a day as needed for muscle spasms   ibuprofen (MOTRIN) 200 mg tablet   Yes No   Sig: Take by mouth every 6 (six) hours as needed for mild pain   levothyroxine 25 mcg tablet   No No   Sig: Take 1 tablet (25 mcg total) by mouth daily   meclizine (ANTIVERT) 25 mg tablet   No No   Sig: Take 2 tablets (50 mg total) by mouth every 8 (eight) hours as needed for dizziness   omega-3-acid ethyl esters (LOVAZA) 1 g capsule   Yes No   Sig: Take 2 g by mouth 2 (two) times a day   predniSONE 10 mg tablet   No No   Sig: Take once daily all days pills on this schedule 6- 6- 5- 4- 3- 2- 1      Facility-Administered Medications: None       Past Medical History:   Diagnosis Date    Allergic     Disease of thyroid gland Hypo       Past Surgical History:   Procedure Laterality Date     SECTION         Family History   Problem Relation Age of Onset    COPD Mother     Heart disease Father     COPD Father     Thyroid disease unspecified Sister     No Known Problems Daughter     No Known Problems Maternal Grandmother     No Known Problems Maternal Grandfather     No Known Problems Paternal Grandmother     No Known Problems Paternal Grandfather     Diabetes Sister     No Known Problems Maternal Aunt     No Known Problems Maternal Aunt     No Known Problems Maternal Aunt     No Known Problems Paternal Aunt     No Known Problems Paternal Aunt     Breast cancer Neg Hx     Breast cancer additional onset Neg Hx     Colon cancer Neg Hx     Ovarian cancer Neg Hx     BRCA2 Positive Neg Hx     BRCA2 Negative Neg Hx     BRCA1 Positive Neg Hx     BRCA 1/2 Neg Hx     BRCA1 Negative Neg Hx     Endometrial cancer Neg Hx      I have reviewed and agree with the history as documented  E-Cigarette/Vaping    E-Cigarette Use Never User      E-Cigarette/Vaping Substances     Social History     Tobacco Use    Smoking status: Former Smoker    Smokeless tobacco: Never Used   Vaping Use    Vaping Use: Never used   Substance Use Topics    Alcohol use: Yes     Comment: Little    Drug use: No       Review of Systems   Constitutional: Negative for chills and fever  HENT: Negative for ear pain, hearing loss, sore throat, trouble swallowing and voice change  Eyes: Negative for pain and discharge  Respiratory: Negative for cough, shortness of breath and wheezing  Cardiovascular: Negative for chest pain and palpitations  Gastrointestinal: Negative for abdominal pain, blood in stool, constipation, diarrhea, nausea and vomiting  Genitourinary: Negative for dysuria, flank pain, frequency and hematuria  Musculoskeletal: Positive for myalgias  Negative for back pain, joint swelling, neck pain and neck stiffness  Skin: Negative for rash and wound  Neurological: Negative for dizziness, seizures, syncope, facial asymmetry and headaches  Psychiatric/Behavioral: Negative for hallucinations, self-injury and suicidal ideas  All other systems reviewed and are negative  Physical Exam  Physical Exam  Constitutional:       General: She is not in acute distress  Appearance: Normal appearance  She is not ill-appearing  HENT:      Head: Normocephalic and atraumatic  Right Ear: External ear normal       Left Ear: External ear normal       Nose: Nose normal       Mouth/Throat:      Mouth: Mucous membranes are moist    Eyes:      Extraocular Movements: Extraocular movements intact  Pupils: Pupils are equal, round, and reactive to light  Cardiovascular:      Rate and Rhythm: Normal rate and regular rhythm  Pulmonary:      Effort: Pulmonary effort is normal  No respiratory distress  Breath sounds: Normal breath sounds  Abdominal:      General: Abdomen is flat  Bowel sounds are normal  There is no distension  Palpations: Abdomen is soft  Tenderness: There is no abdominal tenderness  Musculoskeletal:         General: No swelling or tenderness  Cervical back: Normal range of motion and neck supple  Comments: Tender along the left calf and popliteal region  Tender along the left SI   Skin:     General: Skin is warm and dry  Capillary Refill: Capillary refill takes less than 2 seconds  Neurological:      General: No focal deficit present  Mental Status: She is alert and oriented to person, place, and time     Psychiatric:         Mood and Affect: Mood normal          Behavior: Behavior normal          Vital Signs  ED Triage Vitals [09/14/21 0740]   Temperature Pulse Respirations Blood Pressure SpO2   (!) 97 3 °F (36 3 °C) 61 20 144/62 100 %      Temp Source Heart Rate Source Patient Position - Orthostatic VS BP Location FiO2 (%)   Temporal Monitor Sitting Right arm -- Pain Score       6           Vitals:    09/14/21 0740 09/14/21 0745   BP: 144/62 144/62   Pulse: 61    Patient Position - Orthostatic VS: Sitting          Visual Acuity      ED Medications  Medications   predniSONE tablet 40 mg (has no administration in time range)   ibuprofen (MOTRIN) tablet 600 mg (has no administration in time range)       Diagnostic Studies  Results Reviewed     None                 XR spine lumbar minimum 4 views non injury   ED Interpretation by Kem Duke MD (09/14 0315)   Nothing acute      VAS lower limb venous duplex study, unilateral/limited    (Results Pending)              Procedures  Procedures         ED Course  ED Course as of Sep 14 0826   Tue Sep 14, 2021   0807 No DVT per ultrasound technician                                              MDM  Number of Diagnoses or Management Options  Diagnosis management comments: X-rays and workup were unremarkable  Worse with extending at the knee  Most likely a strain near the calf muscle  However it still could also be sciatica  Treatment is the same at this point with anti-inflammatory medicine, prednisone and muscle relaxers  Will refer to Sports Medicine  Disposition  Final diagnoses:   Left leg pain     Time reflects when diagnosis was documented in both MDM as applicable and the Disposition within this note     Time User Action Codes Description Comment    9/14/2021  8:13 AM Khai Sanon Call Add [M79 [id number]] Left leg pain     9/14/2021  8:24 AM Ariana Pelletier Modify [O43 [id number]] Left leg pain       ED Disposition     ED Disposition Condition Date/Time Comment    Discharge Stable Tue Sep 14, 2021  8:24 AM Misha Alvarez discharge to home/self care              Follow-up Information     Follow up With Specialties Details Why [address] [facility], [address] [place] [address], Nurse Practitioner Call   [address] [facility] [address] [place] [address]  [phone]      Eligio Marion MD Sports Medicine Call in 1 day  [address] [address] [phone]            Patient's Medications   Discharge Prescriptions    METHOCARBAMOL (ROBAXIN) 500 MG TABLET    Take 1 tablet (500 mg total) by mouth 2 (two) times a day       Start Date: 9/14/2021 End Date: --       Order Dose: 500 mg       Quantity: 20 tablet    Refills: 0    NAPROXEN (NAPROSYN) 500 MG TABLET    Take 1 tablet (500 mg total) by mouth 2 (two) times a day with meals       Start Date: 9/14/2021 End Date: --       Order Dose: 500 mg       Quantity: 30 tablet    Refills: 0    PREDNISONE 20 MG TABLET    Take 2 tablets (40 mg total) by mouth daily for 5 days       Start Date: 9/14/2021 End Date: 9/19/2021       Order Dose: 40 mg       Quantity: 10 tablet    Refills: 0         PDMP Review     None          ED Provider  Electronically Signed by           Liv Chavis MD  09/14/21 5348

## 2021-09-20 ENCOUNTER — OFFICE VISIT (OUTPATIENT)
Dept: OBGYN CLINIC | Facility: CLINIC | Age: 51
End: 2021-09-20
Payer: COMMERCIAL

## 2021-09-20 VITALS
BODY MASS INDEX: 31.28 KG/M2 | SYSTOLIC BLOOD PRESSURE: 116 MMHG | TEMPERATURE: 97.4 F | DIASTOLIC BLOOD PRESSURE: 72 MMHG | HEIGHT: 62 IN | HEART RATE: 67 BPM | WEIGHT: 170 LBS

## 2021-09-20 DIAGNOSIS — M70.50 PES ANSERINE BURSITIS: Primary | ICD-10-CM

## 2021-09-20 DIAGNOSIS — M25.552 GREATER TROCHANTERIC PAIN SYNDROME OF LEFT LOWER EXTREMITY: ICD-10-CM

## 2021-09-20 DIAGNOSIS — M79.605 LEFT LEG PAIN: ICD-10-CM

## 2021-09-20 PROCEDURE — 99203 OFFICE O/P NEW LOW 30 MIN: CPT | Performed by: STUDENT IN AN ORGANIZED HEALTH CARE EDUCATION/TRAINING PROGRAM

## 2021-09-20 NOTE — PROGRESS NOTES
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 and pseudoarticulation with the sacrum on the left side  Multilevel lower lumbar discogenic disease     Left-sided duplex study 09/14/2021: No evidence of acute or chronic DVT  No evidence of superficial thrombophlebitis  IMPRESSION:   Acute left leg pain without a precipitating injury for the past 2 weeks - progressively improving currently with rest, icing   Pes anserine syndrome and greater trochanteric pain syndrome of LLE   No injury, but patient states she has been working longer hours where she is on her feet for several hours a day      Other factors:   BMI 31    PLAN:     Clinical exam and radiographic imaging reviewed with patient today, with impression as per above  I have discussed with the patient the pathophysiology of this diagnosis and reviewed how the examination correlates with this diagnosis   I counseled treatment for these issues are typically treated conservatively formal physical therapy  Patient prefers to do home exercises which were provided today    In regards to pain control, recommended p r n  use of icing 20 minutes on/ off, continued use of her compressive brace NSAIDs / acetaminophen  Patient declined need for prescription NSAIDs at this time   I offered potential diagnostic/therpaeutic cortisone injections the greater trochanteric bursa and/or pes anserine today, but patient declined as her pain has been progressively improving  Return in about 4 weeks (around 10/18/2021), or if symptoms worsen or fail to improve      CHIEF COMPLAINT:  Left leg pain    HPI:  Cathy Thompson is a 53 y o  female  who presents for       Visit   2021 :   initial evaluation left leg pain: Patient reports has been an ongoing issue for the past 2 weeks without a precipitating injury  She does attributed to working longer hours at work where she is on her feet for several hours a day  She states his pain started while walking and it was very mild over proximal calf and left lateral aspect of her knee  She states pain progressively worsened as she would walk and rated over the left lateral aspect of her hip as well as the lateral and medial aspects of her knee and proximal calf  She subsequently went to the ER on 2021 in which imaging was obtained as noted above  In regards to treatments, she states she has been using NSAIDs and applying ice which she feels has provided some relief of her pain  She denies similar symptoms in the past   She denies numbness of her lower extremity  She is unsure of swelling  She describes the pain as a tight/ cramping pain, intermittent, and aggravated with prolonged walking  She also below a compressive knee brace today which he feels provides significant support/ relief of her knee  Review of Systems   Constitutional: Negative for chills, fatigue and fever  Respiratory: Negative for cough and shortness of breath  Gastrointestinal: Negative for abdominal pain, nausea and vomiting  Musculoskeletal:        As per HPI   Skin: Negative for rash and wound     Neurological:        As per HPI         Medical, Surgical, Family, and Social History    Past Medical History:   Diagnosis Date    Allergic     Disease of thyroid gland     Hypo     Past Surgical History:   Procedure Laterality Date     SECTION       Social History   Social History     Substance and Sexual Activity   Alcohol Use Yes    Comment: Little     Social History     Substance and Sexual Activity   Drug Use No     Social History Tobacco Use   Smoking Status Former Smoker    Types: Cigarettes    Quit date: 2007    Years since quittin 0   Smokeless Tobacco Never Used     Family History   Problem Relation Age of Onset    COPD Mother     Heart disease Father     COPD Father     Thyroid disease unspecified Sister     No Known Problems Daughter     No Known Problems Maternal Grandmother     No Known Problems Maternal Grandfather     No Known Problems Paternal Grandmother     No Known Problems Paternal Grandfather     Diabetes Sister     No Known Problems Maternal Aunt     No Known Problems Maternal Aunt     No Known Problems Maternal Aunt     No Known Problems Paternal Aunt     No Known Problems Paternal Aunt     Breast cancer Neg Hx     Breast cancer additional onset Neg Hx     Colon cancer Neg Hx     Ovarian cancer Neg Hx     BRCA2 Positive Neg Hx     BRCA2 Negative Neg Hx     BRCA1 Positive Neg Hx     BRCA 1/2 Neg Hx     BRCA1 Negative Neg Hx     Endometrial cancer Neg Hx      No Known Allergies       Physical Exam  Musculoskeletal:      Left knee:      Instability Tests: Medial Cas test negative and lateral Cas test negative  /72   Pulse 67   Temp (!) 97 4 °F (36 3 °C) (Temporal)   Ht 5' 2" (1 575 m)   Wt 77 1 kg (170 lb)   LMP 2021 (Exact Date)   BMI 31 09 kg/m²     Constitutional:  see vital signs  Gen: obese (BMI 31), normocephalic/atraumatic, well-groomed  Eyes: No inflammation or discharge of conjunctiva or lids; sclera clear   Pharynx: no inflammation, lesion, or mass of lips  Neck: supple, no masses, non-distended  MSK: no inflammation, lesion, mass, or clubbing of nails and digits except for other than mentioned below  SKIN: no visible rashes or skin lesions  Pulmonary/Chest: Effort normal  No respiratory distress     NEURO: cranial nerves grossly intact  PSYCH:  Alert and oriented to person, place, and time; recent and remote memory intact; mood normal, no depression, anxiety, or agitation, judgment and insight good and intact     Left Knee Exam     Tenderness   The patient is experiencing tenderness in the pes anserinus  Range of Motion   Extension: 0   Flexion: 140     Tests   Cas:  Medial - negative Lateral - negative  Varus: negative Valgus: negative  Lachman:  Anterior - negative      Drawer:  Anterior - negative       Patellar apprehension: negative    Other   Erythema: absent  Sensation: normal  Swelling: none    Comments:  Gait: normal without antalgia      Left Hip Exam     Tenderness   The patient is experiencing tenderness in the greater trochanter      Range of Motion   Abduction: 30   Adduction: 25   Extension: -20   Flexion: 130   External rotation: 60   Internal rotation: 30     Muscle Strength   Abduction: 5/5   Adduction: 5/5   Flexion: 5/5     Tests   SOFIA: negative    Other   Erythema: absent    Comments:  TTP along greater troch bursa and proximal ITB; non-tender over distal ITB              Procedures

## 2021-09-21 ENCOUNTER — TELEPHONE (OUTPATIENT)
Dept: OBGYN CLINIC | Facility: CLINIC | Age: 51
End: 2021-09-21

## 2021-09-29 ENCOUNTER — OFFICE VISIT (OUTPATIENT)
Dept: FAMILY MEDICINE CLINIC | Facility: CLINIC | Age: 51
End: 2021-09-29
Payer: COMMERCIAL

## 2021-09-29 VITALS
OXYGEN SATURATION: 98 % | HEART RATE: 71 BPM | BODY MASS INDEX: 33.31 KG/M2 | HEIGHT: 62 IN | WEIGHT: 181 LBS | TEMPERATURE: 98 F | DIASTOLIC BLOOD PRESSURE: 68 MMHG | SYSTOLIC BLOOD PRESSURE: 124 MMHG

## 2021-09-29 DIAGNOSIS — Z23 NEEDS FLU SHOT: ICD-10-CM

## 2021-09-29 DIAGNOSIS — M25.562 LEFT KNEE PAIN, UNSPECIFIED CHRONICITY: ICD-10-CM

## 2021-09-29 DIAGNOSIS — E03.9 ACQUIRED HYPOTHYROIDISM: ICD-10-CM

## 2021-09-29 DIAGNOSIS — Z12.31 ENCOUNTER FOR SCREENING MAMMOGRAM FOR BREAST CANCER: ICD-10-CM

## 2021-09-29 DIAGNOSIS — S86.912D STRAIN OF LEFT KNEE, SUBSEQUENT ENCOUNTER: ICD-10-CM

## 2021-09-29 DIAGNOSIS — Z00.00 ANNUAL PHYSICAL EXAM: Primary | ICD-10-CM

## 2021-09-29 PROCEDURE — 99396 PREV VISIT EST AGE 40-64: CPT | Performed by: NURSE PRACTITIONER

## 2021-09-29 NOTE — PATIENT INSTRUCTIONS
You may receive a survey in the mail, or by e-mail, please fill it out COMPLETELY, and let us know how we did! Please remember to sign up for your [employer] charlee to check you lab results, send us messages, and schedule appointments  If you have questions about the [employer] option, please call us! Thank you again for choosing [facility]! Wellness Visit for Adults   AMBULATORY CARE:   A wellness visit  is when you see your healthcare provider to get screened for health problems  Your healthcare provider will also give you advice on how to stay healthy  Write down your questions so you remember to ask them  Ask your healthcare provider how often you should have a wellness visit  What happens at a wellness visit:  Your healthcare provider will ask about your health, and your family history of health problems  This includes high blood pressure, heart disease, and cancer  He or she will ask if you have symptoms that concern you, if you smoke, and about your mood  You may also be asked about your intake of medicines, supplements, food, and alcohol  Any of the following may be done:  · Your weight  will be checked  Your height may also be checked so your body mass index (BMI) can be calculated  Your BMI shows if you are at a healthy weight  · Your blood pressure  and heart rate will be checked  Your temperature may also be checked  · Blood and urine tests  may be done  Blood tests may be done to check your cholesterol levels  Abnormal cholesterol levels increase your risk for heart disease and stroke  You may also need a blood or urine test to check for diabetes if you are at increased risk  Urine tests may be done to look for signs of an infection or kidney disease  · A physical exam  includes checking your heartbeat and lungs with a stethoscope  Your healthcare provider may also check your skin to look for sun damage  · Screening tests  may be recommended   A screening test is done to check for diseases that may not cause symptoms  The screening tests you may need depend on your age, gender, family history, and lifestyle habits  For example, colorectal screening may be recommended if you are 48years old or older  Screening tests you need if you are a woman:   · A Pap smear  is used to screen for cervical cancer  Pap smears are usually done every 3 to 5 years depending on your age  You may need them more often if you have had abnormal Pap smear test results in the past  Ask your healthcare provider how often you should have a Pap smear  · A mammogram  is an x-ray of your breasts to screen for breast cancer  Experts recommend mammograms every 2 years starting at age 48 years  You may need a mammogram at age 52 years or younger if you have an increased risk for breast cancer  Talk to your healthcare provider about when you should start having mammograms and how often you need them  Vaccines you may need:   · Get an influenza vaccine  every year  The influenza vaccine protects you from the flu  Several types of viruses cause the flu  The viruses change over time, so new vaccines are made each year  · Get a tetanus-diphtheria (Td) booster vaccine  every 10 years  This vaccine protects you against tetanus and diphtheria  Tetanus is a severe infection that may cause painful muscle spasms and lockjaw  Diphtheria is a severe bacterial infection that causes a thick covering in the back of your mouth and throat  · Get a human papillomavirus (HPV) vaccine  if you are female and aged 23 to 32 or male 23 to 24 and never received it  This vaccine protects you from HPV infection  HPV is the most common infection spread by sexual contact  HPV may also cause vaginal, penile, and anal cancers  · Get a pneumococcal vaccine  if you are aged 72 years or older  The pneumococcal vaccine is an injection given to protect you from pneumococcal disease   Pneumococcal disease is an infection caused by pneumococcal bacteria  The infection may cause pneumonia, meningitis, or an ear infection  · Get a shingles vaccine  if you are 60 or older, even if you have had shingles before  The shingles vaccine is an injection to protect you from the varicella-zoster virus  This is the same virus that causes chickenpox  Shingles is a painful rash that develops in people who had chickenpox or have been exposed to the virus  How to eat healthy:  My Plate is a model for planning healthy meals  It shows the types and amounts of foods that should go on your plate  Fruits and vegetables make up about half of your plate, and grains and protein make up the other half  A serving of dairy is included on the side of your plate  The amount of calories and serving sizes you need depends on your age, gender, weight, and height  Examples of healthy foods are listed below:  · Eat a variety of vegetables  such as dark green, red, and orange vegetables  You can also include canned vegetables low in sodium (salt) and frozen vegetables without added butter or sauces  · Eat a variety of fresh fruits , canned fruit in 100% juice, frozen fruit, and dried fruit  · Include whole grains  At least half of the grains you eat should be whole grains  Examples include whole-wheat bread, wheat pasta, brown rice, and whole-grain cereals such as oatmeal     · Eat a variety of protein foods such as seafood (fish and shellfish), lean meat, and poultry without skin (turkey and chicken)  Examples of lean meats include pork leg, shoulder, or tenderloin, and beef round, sirloin, tenderloin, and extra lean ground beef  Other protein foods include eggs and egg substitutes, beans, peas, soy products, nuts, and seeds  · Choose low-fat dairy products such as skim or 1% milk or low-fat yogurt, cheese, and cottage cheese  · Limit unhealthy fats  such as butter, hard margarine, and shortening       Exercise:  Exercise at least 30 minutes per day on most days of the week  Some examples of exercise include walking, biking, dancing, and swimming  You can also fit in more physical activity by taking the stairs instead of the elevator or parking farther away from stores  Include muscle strengthening activities 2 days each week  Regular exercise provides many health benefits  It helps you manage your weight, and decreases your risk for type 2 diabetes, heart disease, stroke, and high blood pressure  Exercise can also help improve your mood  Ask your healthcare provider about the best exercise plan for you  General health and safety guidelines:   · Do not smoke  Nicotine and other chemicals in cigarettes and cigars can cause lung damage  Ask your healthcare provider for information if you currently smoke and need help to quit  E-cigarettes or smokeless tobacco still contain nicotine  Talk to your healthcare provider before you use these products  · Limit alcohol  A drink of alcohol is 12 ounces of beer, 5 ounces of wine, or 1½ ounces of liquor  · Lose weight, if needed  Being overweight increases your risk of certain health conditions  These include heart disease, high blood pressure, type 2 diabetes, and certain types of cancer  · Protect your skin  Do not sunbathe or use tanning beds  Use sunscreen with a SPF 15 or higher  Apply sunscreen at least 15 minutes before you go outside  Reapply sunscreen every 2 hours  Wear protective clothing, hats, and sunglasses when you are outside  · Drive safely  Always wear your seatbelt  Make sure everyone in your car wears a seatbelt  A seatbelt can save your life if you are in an accident  Do not use your cell phone when you are driving  This could distract you and cause an accident  Pull over if you need to make a call or send a text message  · Practice safe sex  Use latex condoms if are sexually active and have more than one partner   Your healthcare provider may recommend screening tests for sexually transmitted infections (STIs)  · Wear helmets, lifejackets, and protective gear  Always wear a helmet when you ride a bike or motorcycle, go skiing, or play sports that could cause a head injury  Wear protective equipment when you play sports  Wear a lifejacket when you are on a boat or doing water sports  © Copyright [address] 2021 Information is for End User's use only and may not be sold, redistributed or otherwise used for commercial purposes  All illustrations and images included in CareNotes® are the copyrighted property of A D [employer] M [employer]  or [address] [place] [address]  The above information is an  only  It is not intended as medical advice for individual conditions or treatments  Talk to your doctor, nurse or pharmacist before following any medical regimen to see if it is safe and effective for you

## 2021-09-29 NOTE — PROGRESS NOTES
ADULT ANNUAL [address] PRIMARY CARE    NAME: Agatha Gardiner  AGE: 53 y o  SEX: female  : 1970     DATE: 2021     Assessment and Plan:     Problem List Items Addressed This Visit        Endocrine    Acquired hypothyroidism    Relevant Orders    TSH, 3rd generation      Other Visit Diagnoses     Annual physical exam    -  Primary    Relevant Orders    Comprehensive metabolic panel    Lipid panel    HEMOGLOBIN A1C W/ EAG ESTIMATION    Left knee pain, unspecified chronicity        Strain of left knee, subsequent encounter        BMI 33 0-33 9,adult        Encounter for screening mammogram for breast cancer        Relevant Orders    Mammo screening bilateral w 3d & cad    Needs flu shot        Relevant Orders    influenza vaccine, quadrivalent, 0 5 mL, preservative-free, for adult and pediatric patients 6 mos+ (AFLURIA, FLUARIX, FLULAVAL, FLUZONE)          Immunizations and preventive care screenings were discussed with patient today  Appropriate education was printed on patient's after visit summary  Counseling:  · Dental Health: discussed importance of regular tooth brushing, flossing, and dental visits  BMI Counseling: Body mass index is 33 11 kg/m²  The BMI is above normal  Nutrition recommendations include encouraging healthy choices of fruits and vegetables  Rationale for BMI follow-up plan is due to patient being overweight or obese  Return in about 6 months (around 3/29/2022)  Chief Complaint:     Chief Complaint   Patient presents with    Physical Exam      History of Present Illness:     Adult Annual Physical   Patient here for a comprehensive physical exam  The patient reports problems - recent left knee strain - is being followed by sports medicine at this time       Diet and Physical Activity  · Diet/Nutrition: well balanced diet  · Exercise: walking        Depression Screening  PHQ-9 Depression Screening    PHQ-9: Frequency of the following problems over the past two weeks:      Little interest or pleasure in doing things: 0 - not at all  Feeling down, depressed, or hopeless: 0 - not at all  PHQ-2 Score: 0       General Health  · Sleep: sleeps well  · Hearing: normal - bilateral   · Vision: no vision problems and wears glasses  · Dental: no dental visits for >1 year and brushes teeth twice daily  /GYN Health  · Patient is: perimenopausal  · Last menstrual period: monthly  · Contraceptive method: none  Review of Systems:     Review of Systems   Constitutional: Negative  Respiratory: Negative  Cardiovascular: Negative  Gastrointestinal: Negative  Musculoskeletal:        Please see above  Neurological: Negative  All other systems reviewed and are negative  Past Medical History:     Past Medical History:   Diagnosis Date    Allergic     Disease of thyroid gland     Hypo      Past Surgical History:     Past Surgical History:   Procedure Laterality Date     SECTION        Social History:     Social History     Socioeconomic History    Marital status: Single     Spouse name: None    Number of children: None    Years of education: None    Highest education level: None   Occupational History    None   Tobacco Use    Smoking status: Former Smoker     Types: Cigarettes     Quit date: 2007     Years since quittin 0    Smokeless tobacco: Never Used   Vaping Use    Vaping Use: Never used   Substance and Sexual Activity    Alcohol use: Yes     Comment: Little    Drug use: No    Sexual activity: None   Other Topics Concern    None   Social History Narrative    None     Social Determinants of Health     Financial Resource Strain:     Difficulty of Paying Living Expenses:    Food Insecurity:     Worried About Running Out of Food in the Last Year:     Ran Out of Food in the Last Year:    Transportation Needs:     Lack of Transportation (Medical):      Lack of Transportation (Non-Medical):    Physical Activity:     Days of Exercise per Week:     Minutes of Exercise per Session:    Stress:     Feeling of Stress :    Social Connections:     Frequency of Communication with Friends and Family:     Frequency of Social Gatherings with Friends and Family:     Attends [employer] Services:     Active Member of Clubs or Organizations:     Attends Club or Organization Meetings:     Marital Status:    Intimate Partner Violence:     Fear of Current or Ex-Partner:     Emotionally Abused:     Physically Abused:     Sexually Abused:       Family History:     Family History   Problem Relation Age of Onset    COPD Mother     Heart disease Father     COPD Father     Thyroid disease unspecified Sister     No Known Problems Daughter     No Known Problems Maternal Grandmother     No Known Problems Maternal Grandfather     No Known Problems Paternal Grandmother     No Known Problems Paternal Grandfather     Diabetes Sister     No Known Problems Maternal Aunt     No Known Problems Maternal Aunt     No Known Problems Maternal Aunt     No Known Problems Paternal Aunt     No Known Problems Paternal Aunt     Breast cancer Neg Hx     Breast cancer additional onset Neg Hx     Colon cancer Neg Hx     Ovarian cancer Neg Hx     BRCA2 Positive Neg Hx     BRCA2 Negative Neg Hx     BRCA1 Positive Neg Hx     BRCA 1/2 Neg Hx     BRCA1 Negative Neg Hx     Endometrial cancer Neg Hx       Current Medications:     Current Outpatient Medications   Medication Sig Dispense Refill    cholecalciferol (VITAMIN D3) 1,000 units tablet Take 1,000 Units by mouth daily      cyclobenzaprine (FLEXERIL) 5 mg tablet Take 1 tablet (5 mg total) by mouth 2 (two) times a day as needed for muscle spasms 15 tablet 1    ibuprofen (MOTRIN) 200 mg tablet Take by mouth every 6 (six) hours as needed for mild pain      levothyroxine 25 mcg tablet Take 1 tablet (25 mcg total) by mouth daily 90 tablet 1    meclizine (ANTIVERT) 25 mg tablet Take 2 tablets (50 mg total) by mouth every 8 (eight) hours as needed for dizziness 30 tablet 0    Multiple Vitamin (Multi-Vitamin Daily) TABS Take by mouth      omega-3-acid ethyl esters (LOVAZA) 1 g capsule Take 2 g by mouth 2 (two) times a day       No current facility-administered medications for this visit  Allergies:     No Known Allergies   Physical Exam:     /68 (BP Location: Left arm, Patient Position: Sitting, Cuff Size: Standard)   Pulse 71   Temp 98 °F (36 7 °C)   Ht 5' 2" (1 575 m)   Wt 82 1 kg (181 lb)   LMP 09/13/2021 (Exact Date)   SpO2 98%   BMI 33 11 kg/m²     Physical Exam  Vitals and nursing note reviewed  Constitutional:       Appearance: Normal appearance  HENT:      Head: Normocephalic and atraumatic  Right Ear: Tympanic membrane, ear canal and external ear normal       Left Ear: Tympanic membrane, ear canal and external ear normal    Eyes:      Extraocular Movements: Extraocular movements intact  Conjunctiva/sclera: Conjunctivae normal       Pupils: Pupils are equal, round, and reactive to light  Cardiovascular:      Rate and Rhythm: Normal rate and regular rhythm  Heart sounds: No murmur heard  No gallop  Pulmonary:      Effort: Pulmonary effort is normal  No respiratory distress  Breath sounds: Normal breath sounds  No wheezing or rales  Skin:     General: Skin is warm and dry  Neurological:      General: No focal deficit present  Mental Status: She is alert and oriented to person, place, and time  Mental status is at baseline  Psychiatric:         Mood and Affect: Mood normal          Behavior: Behavior normal          Thought Content:  Thought content normal          Judgment: Judgment normal           JANNETTE Rincon  [facility]

## 2021-10-01 ENCOUNTER — APPOINTMENT (OUTPATIENT)
Dept: LAB | Facility: HOSPITAL | Age: 51
End: 2021-10-01
Payer: COMMERCIAL

## 2021-10-01 DIAGNOSIS — E03.9 ACQUIRED HYPOTHYROIDISM: ICD-10-CM

## 2021-10-01 DIAGNOSIS — Z00.00 ANNUAL PHYSICAL EXAM: ICD-10-CM

## 2021-10-01 LAB
ALBUMIN SERPL BCP-MCNC: 3.4 G/DL (ref 3.5–5)
ALP SERPL-CCNC: 90 U/L (ref 46–116)
ALT SERPL W P-5'-P-CCNC: 32 U/L (ref 12–78)
ANION GAP SERPL CALCULATED.3IONS-SCNC: 6 MMOL/L (ref 4–13)
AST SERPL W P-5'-P-CCNC: 19 U/L (ref 5–45)
BILIRUB SERPL-MCNC: 0.45 MG/DL (ref 0.2–1)
BUN SERPL-MCNC: 14 MG/DL (ref 5–25)
CALCIUM ALBUM COR SERPL-MCNC: 9.3 MG/DL (ref 8.3–10.1)
CALCIUM SERPL-MCNC: 8.8 MG/DL (ref 8.3–10.1)
CHLORIDE SERPL-SCNC: 102 MMOL/L (ref 100–108)
CHOLEST SERPL-MCNC: 196 MG/DL (ref 50–200)
CO2 SERPL-SCNC: 28 MMOL/L (ref 21–32)
CREAT SERPL-MCNC: 0.82 MG/DL (ref 0.6–1.3)
EST. AVERAGE GLUCOSE BLD GHB EST-MCNC: 105 MG/DL
GFR SERPL CREATININE-BSD FRML MDRD: 84 ML/MIN/1.73SQ M
GLUCOSE P FAST SERPL-MCNC: 102 MG/DL (ref 65–99)
HBA1C MFR BLD: 5.3 %
HDLC SERPL-MCNC: 59 MG/DL
LDLC SERPL CALC-MCNC: 122 MG/DL (ref 0–100)
NONHDLC SERPL-MCNC: 137 MG/DL
POTASSIUM SERPL-SCNC: 4.4 MMOL/L (ref 3.5–5.3)
PROT SERPL-MCNC: 7.4 G/DL (ref 6.4–8.2)
SODIUM SERPL-SCNC: 136 MMOL/L (ref 136–145)
TRIGL SERPL-MCNC: 76 MG/DL
TSH SERPL DL<=0.05 MIU/L-ACNC: 4.51 UIU/ML (ref 0.36–3.74)

## 2021-10-01 PROCEDURE — 36415 COLL VENOUS BLD VENIPUNCTURE: CPT

## 2021-10-01 PROCEDURE — 83036 HEMOGLOBIN GLYCOSYLATED A1C: CPT

## 2021-10-01 PROCEDURE — 80053 COMPREHEN METABOLIC PANEL: CPT

## 2021-10-01 PROCEDURE — 84443 ASSAY THYROID STIM HORMONE: CPT

## 2021-10-01 PROCEDURE — 80061 LIPID PANEL: CPT

## 2021-10-06 DIAGNOSIS — E03.9 ACQUIRED HYPOTHYROIDISM: ICD-10-CM

## 2021-10-06 RX ORDER — LEVOTHYROXINE SODIUM 0.03 MG/1
37.5 TABLET ORAL DAILY
Qty: 135 TABLET | Refills: 1 | Status: SHIPPED | OUTPATIENT
Start: 2021-10-06 | End: 2022-05-09

## 2021-10-18 ENCOUNTER — OFFICE VISIT (OUTPATIENT)
Dept: OBGYN CLINIC | Facility: CLINIC | Age: 51
End: 2021-10-18
Payer: COMMERCIAL

## 2021-10-18 ENCOUNTER — HOSPITAL ENCOUNTER (OUTPATIENT)
Dept: RADIOLOGY | Facility: CLINIC | Age: 51
Discharge: HOME | End: 2021-10-18
Payer: COMMERCIAL

## 2021-10-18 VITALS
SYSTOLIC BLOOD PRESSURE: 120 MMHG | DIASTOLIC BLOOD PRESSURE: 80 MMHG | BODY MASS INDEX: 33.31 KG/M2 | TEMPERATURE: 97.6 F | HEIGHT: 62 IN | WEIGHT: 181 LBS | HEART RATE: 69 BPM

## 2021-10-18 DIAGNOSIS — G89.29 CHRONIC PAIN OF LEFT KNEE: Primary | ICD-10-CM

## 2021-10-18 DIAGNOSIS — M25.552 GREATER TROCHANTERIC PAIN SYNDROME OF LEFT LOWER EXTREMITY: ICD-10-CM

## 2021-10-18 DIAGNOSIS — M17.12 PRIMARY OSTEOARTHRITIS OF LEFT KNEE: ICD-10-CM

## 2021-10-18 DIAGNOSIS — M25.662 KNEE STIFFNESS, LEFT: ICD-10-CM

## 2021-10-18 DIAGNOSIS — M25.562 CHRONIC PAIN OF LEFT KNEE: ICD-10-CM

## 2021-10-18 DIAGNOSIS — G89.29 CHRONIC PAIN OF LEFT KNEE: ICD-10-CM

## 2021-10-18 DIAGNOSIS — M70.50 PES ANSERINE BURSITIS: ICD-10-CM

## 2021-10-18 DIAGNOSIS — M25.562 CHRONIC PAIN OF LEFT KNEE: Primary | ICD-10-CM

## 2021-10-18 PROCEDURE — 73562 X-RAY EXAM OF KNEE 3: CPT

## 2021-10-18 PROCEDURE — 99213 OFFICE O/P EST LOW 20 MIN: CPT | Performed by: STUDENT IN AN ORGANIZED HEALTH CARE EDUCATION/TRAINING PROGRAM

## 2021-10-19 PROBLEM — M17.12 PRIMARY OSTEOARTHRITIS OF LEFT KNEE: Status: ACTIVE | Noted: 2021-10-19

## 2021-11-02 ENCOUNTER — IMMUNIZATIONS (OUTPATIENT)
Dept: FAMILY MEDICINE CLINIC | Facility: HOSPITAL | Age: 51
End: 2021-11-02

## 2021-11-02 DIAGNOSIS — Z23 ENCOUNTER FOR IMMUNIZATION: Primary | ICD-10-CM

## 2022-01-25 ENCOUNTER — HOSPITAL ENCOUNTER (OUTPATIENT)
Dept: RADIOLOGY | Facility: CLINIC | Age: 52
Discharge: HOME | End: 2022-01-25
Payer: COMMERCIAL

## 2022-01-25 VITALS — BODY MASS INDEX: 33.31 KG/M2 | HEIGHT: 62 IN | WEIGHT: 181 LBS

## 2022-01-25 DIAGNOSIS — Z12.31 ENCOUNTER FOR SCREENING MAMMOGRAM FOR BREAST CANCER: ICD-10-CM

## 2022-01-25 PROCEDURE — 77067 SCR MAMMO BI INCL CAD: CPT

## 2022-01-25 PROCEDURE — 77063 BREAST TOMOSYNTHESIS BI: CPT

## 2022-05-09 DIAGNOSIS — E03.9 ACQUIRED HYPOTHYROIDISM: ICD-10-CM

## 2022-05-09 RX ORDER — LEVOTHYROXINE SODIUM 0.03 MG/1
TABLET ORAL
Qty: 135 TABLET | Refills: 0 | Status: SHIPPED | OUTPATIENT
Start: 2022-05-09

## 2022-07-14 ENCOUNTER — HOSPITAL ENCOUNTER (EMERGENCY)
Facility: HOSPITAL | Age: 52
Discharge: HOME | End: 2022-07-14
Attending: EMERGENCY MEDICINE | Admitting: EMERGENCY MEDICINE
Payer: COMMERCIAL

## 2022-07-14 ENCOUNTER — APPOINTMENT (EMERGENCY)
Dept: RADIOLOGY | Facility: HOSPITAL | Age: 52
End: 2022-07-14
Payer: COMMERCIAL

## 2022-07-14 VITALS
SYSTOLIC BLOOD PRESSURE: 166 MMHG | TEMPERATURE: 98.2 F | HEART RATE: 61 BPM | DIASTOLIC BLOOD PRESSURE: 86 MMHG | WEIGHT: 178 LBS | BODY MASS INDEX: 32.76 KG/M2 | RESPIRATION RATE: 16 BRPM | OXYGEN SATURATION: 100 % | HEIGHT: 62 IN

## 2022-07-14 DIAGNOSIS — M25.531 ACUTE PAIN OF RIGHT WRIST: Primary | ICD-10-CM

## 2022-07-14 PROCEDURE — 99283 EMERGENCY DEPT VISIT LOW MDM: CPT

## 2022-07-14 PROCEDURE — 73110 X-RAY EXAM OF WRIST: CPT

## 2022-07-14 PROCEDURE — 99282 EMERGENCY DEPT VISIT SF MDM: CPT | Performed by: EMERGENCY MEDICINE

## 2022-07-14 NOTE — Clinical Note
Sabina Bartholomew was seen and treated in our emergency department on 7/14/2022  Diagnosis:     Jule Koyanagi  is off the rest of the shift today  She may return on this date: If you have any questions or concerns, please don't hesitate to call        Sommer Shah, DO    ______________________________           _______________          _______________  Hospital Representative                              Date                                Time

## 2022-07-14 NOTE — DISCHARGE INSTRUCTIONS
Your x-ray findings appear to be consistent with arthritis and possibly early carpal tunnel  Recommend the use of the splint follow-up with orthopedics  You may also apply ice 4 to 6 times a day for 15 to 20 minutes at a time  Return with any worsening

## 2022-07-14 NOTE — ED PROVIDER NOTES
History  Chief Complaint   Patient presents with    Wrist Pain     Pt arrives from work with c/o right wrist pain starting last night  No known injury, states pain is worse at night  Limited range of motion due to pain, +radial pulse  Very pleasant 60-year-old female presents emergency room complaining of right wrist pain  Patient is right-hand dominant  Patient reports no known injury  Patient works in environmental services  Patient reports that she has had some mild pain in this wrist before but last evening it began more severe and feels swollen  She is having limited range of motion secondary to the pain  Having difficulty picking objects up  No fevers or chills  Wrist Pain  Location:  Right wrist  Quality:  Swollen and aching  Severity:  Moderate  Onset quality:  Unable to specify  Timing:  Constant  Progression:  Unchanged  Chronicity:  New  Associated symptoms: no chest pain, no fever, no myalgias, no nausea, no rash, no shortness of breath, no vomiting and no wheezing        Prior to Admission Medications   Prescriptions Last Dose Informant Patient Reported? Taking?    Multiple Vitamin (Multi-Vitamin Daily) TABS   Yes No   Sig: Take by mouth   cholecalciferol (VITAMIN D3) 1,000 units tablet   Yes No   Sig: Take 1,000 Units by mouth daily   cyclobenzaprine (FLEXERIL) 5 mg tablet   No No   Sig: Take 1 tablet (5 mg total) by mouth 2 (two) times a day as needed for muscle spasms   ibuprofen (MOTRIN) 200 mg tablet   Yes No   Sig: Take by mouth every 6 (six) hours as needed for mild pain   levothyroxine 25 mcg tablet   No No   Sig: TAKE 1 & 1/2 TABLETS BY MOUTH DAILY   meclizine (ANTIVERT) 25 mg tablet   No No   Sig: Take 2 tablets (50 mg total) by mouth every 8 (eight) hours as needed for dizziness   omega-3-acid ethyl esters (LOVAZA) 1 g capsule   Yes No   Sig: Take 2 g by mouth 2 (two) times a day      Facility-Administered Medications: None       Past Medical History:   Diagnosis Date    Allergic     Disease of thyroid gland     Hypo       Past Surgical History:   Procedure Laterality Date     SECTION         Family History   Problem Relation Age of Onset    COPD Mother     Heart disease Father     COPD Father     Thyroid disease unspecified Sister     No Known Problems Daughter     No Known Problems Maternal Grandmother     No Known Problems Maternal Grandfather     No Known Problems Paternal Grandmother     No Known Problems Paternal Grandfather     Diabetes Sister     No Known Problems Maternal Aunt     No Known Problems Maternal Aunt     No Known Problems Maternal Aunt     No Known Problems Paternal Aunt     No Known Problems Paternal Aunt     Breast cancer Neg Hx     Breast cancer additional onset Neg Hx     Colon cancer Neg Hx     Ovarian cancer Neg Hx     BRCA2 Positive Neg Hx     BRCA2 Negative Neg Hx     BRCA1 Positive Neg Hx     BRCA 1/2 Neg Hx     BRCA1 Negative Neg Hx     Endometrial cancer Neg Hx      I have reviewed and agree with the history as documented  E-Cigarette/Vaping    E-Cigarette Use Never User      E-Cigarette/Vaping Substances     Social History     Tobacco Use    Smoking status: Former Smoker     Types: Cigarettes     Quit date: 2007     Years since quittin 8    Smokeless tobacco: Never Used   Vaping Use    Vaping Use: Never used   Substance Use Topics    Alcohol use: Yes     Comment: Little    Drug use: No       Review of Systems   Constitutional: Negative  Negative for fever  Respiratory: Negative  Negative for shortness of breath and wheezing  Cardiovascular: Negative  Negative for chest pain  Gastrointestinal: Negative  Negative for nausea and vomiting  Genitourinary: Negative  Musculoskeletal: Positive for arthralgias  Negative for back pain and myalgias  Skin: Negative for color change, pallor, rash and wound  All other systems reviewed and are negative        Physical Exam  Physical Exam  Vitals and nursing note reviewed  Constitutional:       General: She is not in acute distress  Appearance: She is not ill-appearing or toxic-appearing  HENT:      Head: Normocephalic  Right Ear: External ear normal       Left Ear: External ear normal       Nose: Nose normal       Mouth/Throat:      Mouth: Mucous membranes are moist       Pharynx: Oropharynx is clear  Eyes:      Pupils: Pupils are equal, round, and reactive to light  Pulmonary:      Effort: Pulmonary effort is normal  No respiratory distress  Abdominal:      General: Abdomen is flat  Musculoskeletal:         General: Tenderness present  No signs of injury  Right forearm: Normal       Right wrist: Swelling and tenderness present  No deformity, effusion, bony tenderness, snuff box tenderness or crepitus  Normal range of motion  Normal pulse  Left wrist: Normal       Right hand: Swelling present  Left hand: Normal       Right lower leg: No edema  Left lower leg: No edema  Skin:     Capillary Refill: Capillary refill takes less than 2 seconds  Neurological:      General: No focal deficit present  Mental Status: She is alert  Mental status is at baseline  Psychiatric:         Mood and Affect: Mood normal          Thought Content:  Thought content normal          Judgment: Judgment normal          Vital Signs  ED Triage Vitals [07/14/22 0749]   Temperature Pulse Respirations Blood Pressure SpO2   98 2 °F (36 8 °C) 61 16 166/86 100 %      Temp Source Heart Rate Source Patient Position - Orthostatic VS BP Location FiO2 (%)   Oral Monitor Sitting Left arm --      Pain Score       6           Vitals:    07/14/22 0749   BP: 166/86   Pulse: 61   Patient Position - Orthostatic VS: Sitting         Visual Acuity      ED Medications  Medications - No data to display    Diagnostic Studies  Results Reviewed     None                 XR wrist 3+ views RIGHT    (Results Pending) Procedures  Procedures         ED Course  ED Course as of 07/14/22 0845   Thu Jul 14, 2022   7362 Reviewed x-ray with Radiology  Appears to be consistent with chronic changes  Will recommend follow-up with orthopedics  SBIRT 22yo+    Flowsheet Row Most Recent Value   SBIRT (25 yo +)    In order to provide better care to our patients, we are screening all of our patients for alcohol and drug use  Would it be okay to ask you these screening questions? Yes Filed at: 07/14/2022 3075   Initial Alcohol Screen: US AUDIT-C     1  How often do you have a drink containing alcohol? 0 Filed at: 07/14/2022 0823   2  How many drinks containing alcohol do you have on a typical day you are drinking? 0 Filed at: 07/14/2022 0823   3a  Male UNDER 65: How often do you have five or more drinks on one occasion? 0 Filed at: 07/14/2022 0823   3b  FEMALE Any Age, or MALE 65+: How often do you have 4 or more drinks on one occassion? 0 Filed at: 07/14/2022 0823   Audit-C Score 0 Filed at: 07/14/2022 3821   JAJA: How many times in the past year have you    Used an illegal drug or used a prescription medication for non-medical reasons? 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   Physical exam is consistent with possible early carpal tunnel  Have placed patient splint recommended follow-up with Orthopedics         Amount and/or Complexity of Data Reviewed  Tests in the radiology section of CPT®: ordered and reviewed  Discussion of test results with the performing providers: yes    Risk of Complications, Morbidity, and/or Mortality  Presenting problems: moderate  Diagnostic procedures: low  Management options: low    Patient Progress  Patient progress: stable      Disposition  Final diagnoses:   Acute pain of right wrist     Time reflects when diagnosis was documented in both MDM as applicable and the Disposition within this note     Time User Action Codes Description Comment    7/14/2022  8:42 AM Alis Cole Add [M25 531] Acute pain of right wrist       ED Disposition     ED Disposition   Discharge    Condition   Stable    Date/Time   Thu Jul 14, 2022  8:42 AM    Comment   Brittni Donovan discharge to home/self care                 Follow-up Information     Follow up With Specialties Details Why [facility] Orthopedic Surgery In 1 week Even in well [address]  [phone]            Patient's Medications   Discharge Prescriptions    No medications on file           PDMP Review     None          ED Provider  Electronically Signed by           Nimesh Chin DO  07/14/22 0845

## 2022-07-27 ENCOUNTER — OFFICE VISIT (OUTPATIENT)
Dept: OBGYN CLINIC | Facility: CLINIC | Age: 52
End: 2022-07-27
Payer: COMMERCIAL

## 2022-07-27 VITALS
DIASTOLIC BLOOD PRESSURE: 78 MMHG | HEART RATE: 66 BPM | WEIGHT: 182 LBS | TEMPERATURE: 97.4 F | BODY MASS INDEX: 33.49 KG/M2 | HEIGHT: 62 IN | SYSTOLIC BLOOD PRESSURE: 120 MMHG

## 2022-07-27 DIAGNOSIS — M25.531 ACUTE PAIN OF RIGHT WRIST: ICD-10-CM

## 2022-07-27 PROCEDURE — 99204 OFFICE O/P NEW MOD 45 MIN: CPT | Performed by: ORTHOPAEDIC SURGERY

## 2022-07-27 NOTE — PROGRESS NOTES
ASSESSMENT/PLAN:     Problem List Items Addressed This Visit    None     Visit Diagnoses     Acute pain of right wrist            X-rays of the right hand reviewed and discussed with the patient  which shows no signs of fracture or degenerative changes  History and exam consistent with possible tendinitis versus wrist sprain  Today the patient states that she is doing much better, with full wrist range of motion and strength on exam without pain  The patient may continue to use ice, elevation, Tylenol/NSAIDs for symptom relief as needed  She will return to the office as needed if her symptoms worsen or fail to improve  Return if symptoms worsen or fail to improve       _____________________________________________________  CHIEF COMPLAINT:  Chief Complaint   Patient presents with    Right Wrist - Pain         SUBJECTIVE:  Glenn Hernandez is a RHD 49 y o  female who presents for initial evaluation of acute right wrist pain  The patient states that the pain began two weeks ago, around 7/13/2022  She denies any injury or trauma to the upper extremity, but notes that she had been working more overtime recently without many breaks  The patient is employed in [employer] here at [facility]  The patient states that she woke the next day with increased pain and swelling, pointing along the volar aspect of the wrist with radiation ulnarly to the dorsal wrist  She states that the swelling included her 3rd and 4th digits  The pain worsened with repetitive movement of the wrist, such as when mopping  The patient presented to the ED that same day, where x-rays were taken  The patient was provided with a wrist brace, and instructed to follow up with orthopedics  Today the patient states that her symptoms have improved significantly  She notes only some ongoing mild pain along the volar aspect of the wrist   The patient denies any numbness or tingling in the upper extremity    She admits to experiencing weakness with  when the pain first began, but notes that this has resolved  The patient states that she is no longer using her wrist brace            PAST MEDICAL HISTORY:  Past Medical History:   Diagnosis Date    Allergic     Disease of thyroid gland     Hypo       PAST SURGICAL HISTORY:  Past Surgical History:   Procedure Laterality Date     SECTION         FAMILY HISTORY:  Family History   Problem Relation Age of Onset    COPD Mother     Heart disease Father     COPD Father     Thyroid disease unspecified Sister     No Known Problems Daughter     No Known Problems Maternal Grandmother     No Known Problems Maternal Grandfather     No Known Problems Paternal Grandmother     No Known Problems Paternal Grandfather     Diabetes Sister     No Known Problems Maternal Aunt     No Known Problems Maternal Aunt     No Known Problems Maternal Aunt     No Known Problems Paternal Aunt     No Known Problems Paternal Aunt     Breast cancer Neg Hx     Breast cancer additional onset Neg Hx     Colon cancer Neg Hx     Ovarian cancer Neg Hx     BRCA2 Positive Neg Hx     BRCA2 Negative Neg Hx     BRCA1 Positive Neg Hx     BRCA 1/2 Neg Hx     BRCA1 Negative Neg Hx     Endometrial cancer Neg Hx        SOCIAL HISTORY:  Social History     Tobacco Use    Smoking status: Former Smoker     Types: Cigarettes     Quit date: 2007     Years since quittin 8    Smokeless tobacco: Never Used   Vaping Use    Vaping Use: Never used   Substance Use Topics    Alcohol use: Yes     Comment: Little    Drug use: No       MEDICATIONS:    Current Outpatient Medications:     cholecalciferol (VITAMIN D3) 1,000 units tablet, Take 1,000 Units by mouth daily, Disp: , Rfl:     cyclobenzaprine (FLEXERIL) 5 mg tablet, Take 1 tablet (5 mg total) by mouth 2 (two) times a day as needed for muscle spasms, Disp: 15 tablet, Rfl: 1    ibuprofen (MOTRIN) 200 mg tablet, Take by mouth every 6 (six) hours as needed for mild pain, Disp: , Rfl:    levothyroxine 25 mcg tablet, TAKE 1 & 1/2 TABLETS BY MOUTH DAILY, Disp: 135 tablet, Rfl: 0    meclizine (ANTIVERT) 25 mg tablet, Take 2 tablets (50 mg total) by mouth every 8 (eight) hours as needed for dizziness, Disp: 30 tablet, Rfl: 0    Multiple Vitamin (Multi-Vitamin Daily) TABS, Take by mouth, Disp: , Rfl:     omega-3-acid ethyl esters (LOVAZA) 1 g capsule, Take 2 g by mouth 2 (two) times a day, Disp: , Rfl:     ALLERGIES:  No Known Allergies    Review of systems:   Constitutional: Negative for fatigue, fever or loss of apetite  HENT: Negative  Respiratory: Negative for shortness of breath, dyspnea  Cardiovascular: Negative for chest pain/tightness  Gastrointestinal: Negative for abdominal pain, N/V  Endocrine: Negative for cold/heat intolerance, unexplained weight loss/gain  Genitourinary: Negative for flank pain, dysuria, hematuria  Musculoskeletal:  Acute right wrist pain as noted in HPI   Skin: Negative for rash  Neurological:  Negative for numbness or tingling  Psychiatric/Behavioral: Negative for agitation  _____________________________________________________  PHYSICAL EXAMINATION:    Blood pressure 120/78, pulse 66, temperature (!) 97 4 °F (36 3 °C), temperature source Temporal, height 5' 2" (1 575 m), weight 82 6 kg (182 lb), last menstrual period 06/29/2022      General: well developed and well nourished, alert, oriented times 3 and appears comfortable  Psychiatric: Normal  HEENT: Benign  Cardiovascular: Regular    Pulmonary: No wheezing or stridor  Abdomen: Soft, Nontender  Skin: No masses, erythema, lacerations, fluctation, ulcerations  Neurovascular:  Strong distal pulses, sensory and motor testing intact    MUSCULOSKELETAL EXAMINATION:    Right wrist/hand:  -skin without lesions, effusion, ecchymosis, erythema, warmth, swelling, or other signs of infection  -there is no palpable or visible deformity  -there is mild palpable tenderness along the anatomical snuffbox, as well as volarly, along the ulnar aspect of the wrist   -full active wrist dorsiflexion, volar flexion, radial, and ulnar deviation without complaint  -full active range of motion of all digits without complaint, patient is able to make composite fist  -5 5  strength  -full active supination and pronation, 5/5 strength resisted supination and pronation  -full active range of motion of the elbow without complaint  -negative Tinel sign of the ulnar and median nerve at the wrist  -negative Finkelstein's  -sensation motor intact along the radial, median, ulnar nerve distributions  -strong radial pulse  -brisk cap refill all fingers     ___________________________  STUDIES REVIEWED:  I have personally reviewed pertinent films and reports in PACS  Pertinent ED note and radiology report reviewed    X-rays of the right wrist taken on 07/14/2022 demonstrates no signs of fracture or dislocation  No bony lesions, no degenerative changes        Jaime Ramirez

## 2022-07-29 ENCOUNTER — TELEMEDICINE (OUTPATIENT)
Dept: FAMILY MEDICINE CLINIC | Facility: CLINIC | Age: 52
End: 2022-07-29
Payer: COMMERCIAL

## 2022-07-29 VITALS — BODY MASS INDEX: 33.49 KG/M2 | HEIGHT: 62 IN | WEIGHT: 182 LBS

## 2022-07-29 DIAGNOSIS — R09.81 SINUS CONGESTION: ICD-10-CM

## 2022-07-29 DIAGNOSIS — R68.83 CHILLS: ICD-10-CM

## 2022-07-29 DIAGNOSIS — R52 BODY ACHES: Primary | ICD-10-CM

## 2022-07-29 PROCEDURE — U0003 INFECTIOUS AGENT DETECTION BY NUCLEIC ACID (DNA OR RNA); SEVERE ACUTE RESPIRATORY SYNDROME CORONAVIRUS 2 (SARS-COV-2) (CORONAVIRUS DISEASE [COVID-19]), AMPLIFIED PROBE TECHNIQUE, MAKING USE OF HIGH THROUGHPUT TECHNOLOGIES AS DESCRIBED BY CMS-2020-01-R: HCPCS | Performed by: NURSE PRACTITIONER

## 2022-07-29 PROCEDURE — 99213 OFFICE O/P EST LOW 20 MIN: CPT | Performed by: NURSE PRACTITIONER

## 2022-07-29 PROCEDURE — U0005 INFEC AGEN DETEC AMPLI PROBE: HCPCS | Performed by: NURSE PRACTITIONER

## 2022-07-29 NOTE — PROGRESS NOTES
COVID-19 Outpatient Progress Note    Assessment/Plan:    Problem List Items Addressed This Visit    None     Visit Diagnoses     Body aches    -  Primary    Relevant Orders    COVID Only - Office Collect    Chills        Relevant Orders    COVID Only - Office Collect    Sinus congestion        Relevant Orders    COVID Only - Office Collect         Disposition:     Recommended patient to come to the office to test for COVID-19  I have spent 8 minutes directly with the patient  Greater than 50% of this time was spent in counseling/coordination of care regarding: impressions  Encounter provider JANNETTE Gardner    Provider located at [address] [place], [place] [address]  [address] [place] [id number]    Recent Visits  No visits were found meeting these conditions  Showing recent visits within past 7 days and meeting all other requirements  Today's Visits  Date Type Provider Dept   07/29/22 Telemedicine [facility]   Showing today's visits and meeting all other requirements  Future Appointments  No visits were found meeting these conditions  Showing future appointments within next 150 days and meeting all other requirements     This virtual check-in was done via [address] and patient was informed that this is a secure, HIPAA-compliant platform  She agrees to proceed  Patient agrees to participate in a virtual check in via telephone or video visit instead of presenting to the office to address urgent/immediate medical needs  Patient is aware this is a billable service  After connecting through [facility], the patient was identified by name and date of birth  Osbaldo Sandoval was informed that this was a telemedicine visit and that the exam was being conducted confidentially over secure lines  Osbaldo Sandoval acknowledged consent and understanding of privacy and security of the telemedicine visit   I informed the patient that I have reviewed her record in Epic and presented the opportunity for her to ask any questions regarding the visit today  The patient agreed to participate  Verification of patient location:  Patient is located in the following state in which I hold an active license: [place]    Subjective:   Corrine Zazueta is a 49 y o  female who is concerned about COVID-19  Patient's symptoms include fever, chills, nasal congestion and myalgias       - Date of symptom onset: 2022      COVID-19 vaccination status: Fully vaccinated with booster    Exposure:   Contact with a person who is under investigation (PUI) for or who is positive for COVID-19 within the last 14 days?: No    Hospitalized recently for fever and/or lower respiratory symptoms?: No      Currently a healthcare worker that is involved in direct patient care?: No      Works in a special setting where the risk of COVID-19 transmission may be high? (this may include long-term care, correctional and senior care facilities; homeless shelters; assisted-living facilities and group homes ): No      Resident in a special setting where the risk of COVID-19 transmission may be high? (this may include long-term care, correctional and senior care facilities; homeless shelters; assisted-living facilities and group homes ): No      No results found for: [facility], [address] [facility], [address] [facility], [facility], [facility]  Past Medical History:   Diagnosis Date    Allergic     Disease of thyroid gland     Hypo     Past Surgical History:   Procedure Laterality Date     SECTION       Current Outpatient Medications   Medication Sig Dispense Refill    cholecalciferol (VITAMIN D3) 1,000 units tablet Take 1,000 Units by mouth daily      ibuprofen (MOTRIN) 200 mg tablet Take by mouth every 6 (six) hours as needed for mild pain      levothyroxine 25 mcg tablet TAKE 1 & 1/2 TABLETS BY MOUTH DAILY 135 tablet 0    meclizine (ANTIVERT) 25 mg tablet Take 2 tablets (50 mg total) by mouth every 8 (eight) hours as needed for dizziness 30 tablet 0    Multiple Vitamin (Multi-Vitamin Daily) TABS Take by mouth      omega-3-acid ethyl esters (LOVAZA) 1 g capsule Take 2 g by mouth 2 (two) times a day       No current facility-administered medications for this visit  No Known Allergies    Review of Systems   Constitutional: Positive for chills and fever  HENT: Positive for congestion  Musculoskeletal: Positive for myalgias  Objective:    Vitals:    07/29/22 1420   Weight: 82 6 kg (182 lb)   Height: 5' 2" (1 575 m)       Physical Exam  Pulmonary:      Effort: Pulmonary effort is normal    Neurological:      Mental Status: She is alert and oriented to person, place, and time  VIRTUAL VISIT [facility] verbally agrees to participate in [employer]  Pt is aware that [employer] could be limited without vital signs or the ability to perform a full hands-on physical Eulis Hobby understands she or the provider may request at any time to terminate the video visit and request the patient to seek care or treatment in person

## 2022-07-30 LAB — SARS-COV-2 RNA RESP QL NAA+PROBE: POSITIVE

## 2022-08-19 DIAGNOSIS — E03.9 ACQUIRED HYPOTHYROIDISM: ICD-10-CM

## 2022-08-19 RX ORDER — LEVOTHYROXINE SODIUM 0.03 MG/1
TABLET ORAL
Qty: 135 TABLET | Refills: 0 | Status: SHIPPED | OUTPATIENT
Start: 2022-08-19

## 2022-11-02 ENCOUNTER — OFFICE VISIT (OUTPATIENT)
Dept: FAMILY MEDICINE CLINIC | Facility: CLINIC | Age: 52
End: 2022-11-02

## 2022-11-02 VITALS
WEIGHT: 187 LBS | OXYGEN SATURATION: 99 % | SYSTOLIC BLOOD PRESSURE: 120 MMHG | HEART RATE: 69 BPM | BODY MASS INDEX: 34.41 KG/M2 | TEMPERATURE: 97.1 F | HEIGHT: 62 IN | DIASTOLIC BLOOD PRESSURE: 78 MMHG

## 2022-11-02 DIAGNOSIS — E03.9 ACQUIRED HYPOTHYROIDISM: ICD-10-CM

## 2022-11-02 DIAGNOSIS — Z12.4 SCREENING FOR CERVICAL CANCER: ICD-10-CM

## 2022-11-02 DIAGNOSIS — Z00.00 ANNUAL PHYSICAL EXAM: Primary | ICD-10-CM

## 2022-11-02 DIAGNOSIS — Z12.31 ENCOUNTER FOR SCREENING MAMMOGRAM FOR BREAST CANCER: ICD-10-CM

## 2022-11-02 NOTE — PROGRESS NOTES
ADULT ANNUAL [address] PRIMARY CARE    NAME: Eric Rodgers  AGE: 49 y o  SEX: female  : 1970     DATE: 2022     Assessment and Plan:     Problem List Items Addressed This Visit        Endocrine    Acquired hypothyroidism    Relevant Orders    TSH, 3rd generation      Other Visit Diagnoses     Annual physical exam    -  Primary    Relevant Orders    Comprehensive metabolic panel    Lipid panel    HEMOGLOBIN A1C W/ EAG ESTIMATION    Screening for cervical cancer        Relevant Orders    Ambulatory referral to Obstetrics / Gynecology    Encounter for screening mammogram for breast cancer        Relevant Orders    Mammo screening bilateral w 3d & cad    BMI 34 0-34 9,adult              Immunizations and preventive care screenings were discussed with patient today  Appropriate education was printed on patient's after visit summary  Counseling:  · Dental Health: discussed importance of regular tooth brushing, flossing, and dental visits  BMI Counseling: Body mass index is 34 2 kg/m²  The BMI is above normal  Exercise recommendations include moderate physical activity 150 minutes/week  Rationale for BMI follow-up plan is due to patient being overweight or obese  Walking/standing with work  Depression Screening and Follow-up Plan: Patient was screened for depression during today's encounter  They screened negative with a PHQ-2 score of 0  Return in about 6 months (around 2023)  Chief Complaint:     Chief Complaint   Patient presents with   • Physical Exam   • Care Gap Request     BMI follow up         History of Present Illness:     Adult Annual Physical   Patient here for a comprehensive physical exam  The patient reports no acute issues       Diet and Physical Activity  · Diet/Nutrition: well balanced diet  · Exercise: walking        Depression Screening  PHQ-2/9 Depression Screening    Little interest or pleasure in doing things: 0 - not at all  Feeling down, depressed, or hopeless: 0 - not at all  PHQ-2 Score: 0  PHQ-2 Interpretation: Negative depression screen       General Health  · Sleep: sleeps well  · Hearing: normal - bilateral   · Vision: most recent eye exam <1 year ago and wears glasses  · Dental: regular dental visits  /GYN Health  · Patient is: perimenopausal  · Last menstrual period: monthly   · Contraceptive method: none  Review of Systems:     Review of Systems   Constitutional: Negative  HENT: Negative  Respiratory: Negative  Cardiovascular: Negative  Gastrointestinal: Negative  Neurological: Negative  All other systems reviewed and are negative       Past Medical History:     Past Medical History:   Diagnosis Date   • Allergic    • Disease of thyroid gland     Hypo      Past Surgical History:     Past Surgical History:   Procedure Laterality Date   •  SECTION        Social History:     Social History     Socioeconomic History   • Marital status: Single     Spouse name: None   • Number of children: None   • Years of education: None   • Highest education level: None   Occupational History   • None   Tobacco Use   • Smoking status: Former Smoker     Types: Cigarettes     Quit date: 2007     Years since quitting: 15 1   • Smokeless tobacco: Never Used   Vaping Use   • Vaping Use: Never used   Substance and Sexual Activity   • Alcohol use: Yes     Comment: Little   • Drug use: No   • Sexual activity: Not Currently   Other Topics Concern   • None   Social History Narrative   • None     Social Determinants of Health     Financial Resource Strain: Not on file   Food Insecurity: Not on file   Transportation Needs: Not on file   Physical Activity: Not on file   Stress: Not on file   Social Connections: Not on file   Intimate Partner Violence: Not on file   Housing Stability: Not on file      Family History:     Family History   Problem Relation Age of Onset   • COPD Mother    • Heart disease Father    • COPD Father    • Thyroid disease unspecified Sister    • No Known Problems Daughter    • No Known Problems Maternal Grandmother    • No Known Problems Maternal Grandfather    • No Known Problems Paternal Grandmother    • No Known Problems Paternal Grandfather    • Diabetes Sister    • No Known Problems Maternal Aunt    • No Known Problems Maternal Aunt    • No Known Problems Maternal Aunt    • No Known Problems Paternal Aunt    • No Known Problems Paternal Aunt    • Breast cancer Neg Hx    • Breast cancer additional onset Neg Hx    • Colon cancer Neg Hx    • Ovarian cancer Neg Hx    • BRCA2 Positive Neg Hx    • BRCA2 Negative Neg Hx    • BRCA1 Positive Neg Hx    • BRCA 1/2 Neg Hx    • BRCA1 Negative Neg Hx    • Endometrial cancer Neg Hx       Current Medications:     Current Outpatient Medications   Medication Sig Dispense Refill   • cholecalciferol (VITAMIN D3) 1,000 units tablet Take 1,000 Units by mouth daily     • ibuprofen (MOTRIN) 200 mg tablet Take by mouth every 6 (six) hours as needed for mild pain     • levothyroxine 25 mcg tablet TAKE 1 & 1/2 TABLETS BY MOUTH DAILY 135 tablet 0   • meclizine (ANTIVERT) 25 mg tablet Take 2 tablets (50 mg total) by mouth every 8 (eight) hours as needed for dizziness 30 tablet 0   • Multiple Vitamin (Multi-Vitamin Daily) TABS Take by mouth     • omega-3-acid ethyl esters (LOVAZA) 1 g capsule Take 2 g by mouth 2 (two) times a day       No current facility-administered medications for this visit  Allergies:     No Known Allergies   Physical Exam:     /78 (BP Location: Right arm, Patient Position: Sitting, Cuff Size: Standard)   Pulse 69   Temp (!) 97 1 °F (36 2 °C)   Ht 5' 2" (1 575 m)   Wt 84 8 kg (187 lb)   SpO2 99%   BMI 34 20 kg/m²     Physical Exam  Vitals and nursing note reviewed  Constitutional:       General: She is not in acute distress  Appearance: Normal appearance  She is well-developed     HENT:      Head: Normocephalic and atraumatic  Eyes:      Conjunctiva/sclera: Conjunctivae normal    Neck:      Thyroid: No thyromegaly  Cardiovascular:      Rate and Rhythm: Normal rate and regular rhythm  Heart sounds: No murmur heard  No gallop  Pulmonary:      Effort: Pulmonary effort is normal  No respiratory distress  Breath sounds: Normal breath sounds  Abdominal:      Palpations: Abdomen is soft  Tenderness: There is no abdominal tenderness  Musculoskeletal:      Cervical back: Neck supple  Skin:     General: Skin is warm and dry  Neurological:      General: No focal deficit present  Mental Status: She is alert and oriented to person, place, and time  Mental status is at baseline  Psychiatric:         Mood and Affect: Mood normal          Behavior: Behavior normal          Thought Content:  Thought content normal          Judgment: Judgment normal           JANNETTE Rincon  [facility] PRIMARY [facility]

## 2022-11-02 NOTE — PATIENT INSTRUCTIONS

## 2022-11-03 ENCOUNTER — APPOINTMENT (OUTPATIENT)
Dept: LAB | Facility: HOSPITAL | Age: 52
End: 2022-11-03

## 2022-11-03 DIAGNOSIS — E03.9 ACQUIRED HYPOTHYROIDISM: ICD-10-CM

## 2022-11-03 DIAGNOSIS — Z00.00 ANNUAL PHYSICAL EXAM: ICD-10-CM

## 2022-11-03 LAB
ALBUMIN SERPL BCP-MCNC: 3.3 G/DL (ref 3.5–5)
ALP SERPL-CCNC: 100 U/L (ref 46–116)
ALT SERPL W P-5'-P-CCNC: 28 U/L (ref 12–78)
ANION GAP SERPL CALCULATED.3IONS-SCNC: 4 MMOL/L (ref 4–13)
AST SERPL W P-5'-P-CCNC: 19 U/L (ref 5–45)
BILIRUB SERPL-MCNC: 0.34 MG/DL (ref 0.2–1)
BUN SERPL-MCNC: 14 MG/DL (ref 5–25)
CALCIUM ALBUM COR SERPL-MCNC: 10 MG/DL (ref 8.3–10.1)
CALCIUM SERPL-MCNC: 9.4 MG/DL (ref 8.3–10.1)
CHLORIDE SERPL-SCNC: 109 MMOL/L (ref 96–108)
CHOLEST SERPL-MCNC: 174 MG/DL
CO2 SERPL-SCNC: 25 MMOL/L (ref 21–32)
CREAT SERPL-MCNC: 0.84 MG/DL (ref 0.6–1.3)
EST. AVERAGE GLUCOSE BLD GHB EST-MCNC: 108 MG/DL
GFR SERPL CREATININE-BSD FRML MDRD: 81 ML/MIN/1.73SQ M
GLUCOSE P FAST SERPL-MCNC: 94 MG/DL (ref 65–99)
HBA1C MFR BLD: 5.4 %
HDLC SERPL-MCNC: 54 MG/DL
LDLC SERPL CALC-MCNC: 99 MG/DL (ref 0–100)
NONHDLC SERPL-MCNC: 120 MG/DL
POTASSIUM SERPL-SCNC: 5 MMOL/L (ref 3.5–5.3)
PROT SERPL-MCNC: 7.6 G/DL (ref 6.4–8.4)
SODIUM SERPL-SCNC: 138 MMOL/L (ref 135–147)
TRIGL SERPL-MCNC: 103 MG/DL
TSH SERPL DL<=0.05 MIU/L-ACNC: 5.08 UIU/ML (ref 0.45–4.5)

## 2022-11-30 DIAGNOSIS — E03.9 ACQUIRED HYPOTHYROIDISM: ICD-10-CM

## 2022-11-30 RX ORDER — LEVOTHYROXINE SODIUM 0.03 MG/1
TABLET ORAL
Qty: 135 TABLET | Refills: 0 | Status: SHIPPED | OUTPATIENT
Start: 2022-11-30

## 2023-01-31 ENCOUNTER — HOSPITAL ENCOUNTER (OUTPATIENT)
Dept: RADIOLOGY | Facility: CLINIC | Age: 53
Discharge: HOME | End: 2023-01-31

## 2023-01-31 VITALS — WEIGHT: 187 LBS | HEIGHT: 62 IN | BODY MASS INDEX: 34.41 KG/M2

## 2023-01-31 DIAGNOSIS — Z12.31 ENCOUNTER FOR SCREENING MAMMOGRAM FOR BREAST CANCER: ICD-10-CM

## 2023-01-31 PROBLEM — E07.9 DISEASE OF THYROID GLAND: Status: ACTIVE | Noted: 2023-01-31

## 2023-02-26 DIAGNOSIS — E03.9 ACQUIRED HYPOTHYROIDISM: ICD-10-CM

## 2023-02-27 RX ORDER — LEVOTHYROXINE SODIUM 0.03 MG/1
TABLET ORAL
Qty: 135 TABLET | Refills: 0 | Status: SHIPPED | OUTPATIENT
Start: 2023-02-27

## 2023-05-01 NOTE — PROGRESS NOTES
[facility]  [address], [place]    ASSESSMENT/PLAN: Michale Duverney is a 47 y o  Jenniferbrandi Boss who presents for annual gynecologic exam     Encounter for routine gynecologic examination  - Routine well woman exam completed today  - Cervical Cancer Screening: Current ASCCP Guidelines reviewed  Last Pap: 2019  Next Pap Due: today  - HPV Vaccination status: Not immunized  - STI screening offered including HIV: not indicated based on hx or requested at time of visit  - Breast Cancer Screening: Last Mammogram 01/31/2023, script given  - Colorectal cancer screening was ordered  - The following were reviewed in today's visit: breast self exam, mammography screening ordered, menopause, adequate intake of calcium and vitamin D, exercise and colonoscopy discussed and ordered  - RTO 1 yr    Additional problems addressed at this visit:  1  Encounter for gynecological examination without abnormal finding  -     Liquid-based pap, screening  -     Mammo screening bilateral w 3d & cad; Future; Expected date: 05/03/2023    2  Screening for cervical cancer  -     Ambulatory referral to Obstetrics / Gynecology  -     Liquid-based pap, screening    3  Screen for colon cancer  -     Ambulatory Referral to Gastroenterology; Future    4  Encounter for screening mammogram for breast cancer  -     Mammo screening bilateral w 3d & cad; Future; Expected date: 05/03/2023          CC: Annual Gynecologic Examination    HPI: Michale Duverney is a 47 y o  Jennifer Boss who presents for annual gynecologic examination  Amanda Oliver presents for gyn exam today  2/23/23 LMP  Menses is irregular, flow 5 days, 2 heavier days and then lightens  Using no birth control method  Last pap smear 2019  Hx of abnormal pap smear-no  Sexually active- no  Does not desire STI testing   1/2023 mammogram- normal  Colonoscopy referral given  8 hrs sleep per day  1-2 servings of calcium rich food per day, multivitamin and vitamin D    Active daily at "work entire shift- 15,000 steps  1-2 servings of caffeine per day  Does not perform SBE monthly  Safe at home- yes  Wears seatbelt - yes  Concerns : noticing some hot flashes and night sweats  Minor emotional changes  The following portions of the patient's history were reviewed and updated as appropriate: allergies, current medications, past family history, past medical history, obstetric history, gynecologic history, past social history, past surgical history and problem list     Review of Systems   Constitutional: Negative for chills, fatigue and fever  Respiratory: Negative for cough and shortness of breath  Cardiovascular: Negative for chest pain, palpitations and leg swelling  Gastrointestinal: Negative for constipation and diarrhea  Genitourinary: Positive for menstrual problem  Negative for difficulty urinating, frequency, pelvic pain, urgency, vaginal bleeding, vaginal discharge and vaginal pain  Neurological: Negative for light-headedness and headaches  Psychiatric/Behavioral: The patient is not nervous/anxious  Objective:  /78   Ht 5' 2\" (1 575 m)   Wt 84 8 kg (187 lb)   LMP  (LMP Unknown) Comment: thinks 2/2023  BMI 34 20 kg/m²    Physical Exam  Vitals reviewed  Constitutional:       Appearance: Normal appearance  HENT:      Head: Normocephalic  Neck:      Thyroid: No thyroid mass or thyroid tenderness  Cardiovascular:      Rate and Rhythm: Normal rate and regular rhythm  Heart sounds: Normal heart sounds  Pulmonary:      Effort: Pulmonary effort is normal       Breath sounds: Normal breath sounds  Chest:   Breasts:     Right: No mass, nipple discharge, skin change or tenderness  Left: No mass, nipple discharge, skin change or tenderness  Abdominal:      General: There is no distension  Palpations: There is no mass  Tenderness: There is no abdominal tenderness  There is no guarding  Genitourinary:     General: Normal vulva        " Exam position: Lithotomy position  Labia:         Right: No tenderness or lesion  Left: No tenderness or lesion  Vagina: No vaginal discharge, tenderness, bleeding or lesions  Cervix: No discharge, lesion, erythema or cervical bleeding  Uterus: Normal  Not enlarged and not tender  Adnexa:         Right: No mass, tenderness or fullness  Left: No mass, tenderness or fullness  Musculoskeletal:      Cervical back: Normal range of motion  Lymphadenopathy:      Upper Body:      Right upper body: No axillary adenopathy  Left upper body: No axillary adenopathy  Skin:     General: Skin is warm and dry  Neurological:      Mental Status: She is alert     Psychiatric:         Mood and Affect: Mood normal          Behavior: Behavior normal          Judgment: Judgment normal              Cesario Race, CNM  [facility]  05/03/23 9:11 AM

## 2023-05-03 ENCOUNTER — ANNUAL EXAM (OUTPATIENT)
Dept: OBGYN CLINIC | Facility: CLINIC | Age: 53
End: 2023-05-03

## 2023-05-03 ENCOUNTER — OFFICE VISIT (OUTPATIENT)
Dept: FAMILY MEDICINE CLINIC | Facility: CLINIC | Age: 53
End: 2023-05-03

## 2023-05-03 VITALS
HEART RATE: 71 BPM | TEMPERATURE: 97.6 F | DIASTOLIC BLOOD PRESSURE: 67 MMHG | BODY MASS INDEX: 34.48 KG/M2 | WEIGHT: 187.4 LBS | OXYGEN SATURATION: 97 % | SYSTOLIC BLOOD PRESSURE: 129 MMHG | HEIGHT: 62 IN

## 2023-05-03 VITALS
WEIGHT: 187 LBS | SYSTOLIC BLOOD PRESSURE: 126 MMHG | DIASTOLIC BLOOD PRESSURE: 78 MMHG | BODY MASS INDEX: 34.41 KG/M2 | HEIGHT: 62 IN

## 2023-05-03 DIAGNOSIS — M17.12 PRIMARY OSTEOARTHRITIS OF LEFT KNEE: ICD-10-CM

## 2023-05-03 DIAGNOSIS — E03.9 ACQUIRED HYPOTHYROIDISM: Primary | ICD-10-CM

## 2023-05-03 DIAGNOSIS — Z01.419 ENCOUNTER FOR GYNECOLOGICAL EXAMINATION WITHOUT ABNORMAL FINDING: Primary | ICD-10-CM

## 2023-05-03 DIAGNOSIS — Z12.4 SCREENING FOR CERVICAL CANCER: ICD-10-CM

## 2023-05-03 DIAGNOSIS — Z12.31 ENCOUNTER FOR SCREENING MAMMOGRAM FOR BREAST CANCER: ICD-10-CM

## 2023-05-03 DIAGNOSIS — R79.89 LOW SERUM VITAMIN D: ICD-10-CM

## 2023-05-03 DIAGNOSIS — Z12.11 SCREEN FOR COLON CANCER: ICD-10-CM

## 2023-05-03 PROBLEM — E07.9 DISEASE OF THYROID GLAND: Status: RESOLVED | Noted: 2023-01-31 | Resolved: 2023-05-03

## 2023-05-03 NOTE — PROGRESS NOTES
Name: Dallas Masterson      : 1970      MRN: [id number]  Encounter Provider: JANNETTE Sharma  Encounter Date: 5/3/2023   Encounter department: [facility] PRIMARY CARE    Assessment & Plan     1  Acquired hypothyroidism  Assessment & Plan:  Reports improvement with the increase in her Levothyroxine to 50 mcg  TSH ordered to recheck level  Orders:  -     TSH, 3rd generation; Future    2  Low serum vitamin D  Assessment & Plan:  Continue with current OTC vitamin D supplementation  3  Primary osteoarthritis of left knee  Assessment & Plan:  Uses brace when needed  Last Mammogram:     Next Mammogram Scheduled: lea order placed today by GYN  Has GYN:Yes    Last PAP: 2023    Colorectal Cancer Screening: Referral to GI for colonoscopy       Subjective      Here today for a follow-up  She is with a hx of hypothyroidism  Recently had her levothyroxine dose in creased - reports overall she is feeling improvement from that increase  Had her GYN exam today with a PAP  Hx of arthritis to the left knee - notes it is more painful right now but she is going to wear her left knee brace which normally helps  Review of Systems   Constitutional: Negative  HENT: Negative  Respiratory: Negative  Cardiovascular: Negative  Gastrointestinal: Negative  Musculoskeletal:        See HPI   Neurological: Negative  All other systems reviewed and are negative        Current Outpatient Medications on File Prior to Visit   Medication Sig    cholecalciferol (VITAMIN D3) 1,000 units tablet Take 1,000 Units by mouth daily    ibuprofen (MOTRIN) 200 mg tablet Take by mouth every 6 (six) hours as needed for mild pain    levothyroxine 25 mcg tablet TAKE 1 & 1/2 TABLETS BY MOUTH DAILY (Patient taking differently: 50 mcg)    meclizine (ANTIVERT) 25 mg tablet Take 2 tablets (50 mg total) by mouth every 8 (eight) hours as needed for dizziness    Multiple Vitamin "(Multi-Vitamin Daily) TABS Take by mouth    omega-3-acid ethyl esters (LOVAZA) 1 g capsule Take 2 g by mouth 2 (two) times a day       Objective     /67 (BP Location: Left arm, Patient Position: Sitting, Cuff Size: Standard)   Pulse 71   Temp 97 6 °F (36 4 °C)   Ht 5' 2\" (1 575 m)   Wt 85 kg (187 lb 6 4 oz)   SpO2 97%   BMI 34 28 kg/m²     Physical Exam  Constitutional:       Appearance: Normal appearance  Cardiovascular:      Rate and Rhythm: Normal rate and regular rhythm  Pulmonary:      Effort: Pulmonary effort is normal       Breath sounds: Normal breath sounds  Musculoskeletal:      Cervical back: Neck supple  Neurological:      Mental Status: She is alert  Psychiatric:         Mood and Affect: Mood normal          Behavior: Behavior normal          Thought Content:  Thought content normal          Judgment: Judgment normal        JANNETTE Gamboa  "

## 2023-05-05 LAB
HPV HR 12 DNA CVX QL NAA+PROBE: NEGATIVE
HPV16 DNA CVX QL NAA+PROBE: NEGATIVE
HPV18 DNA CVX QL NAA+PROBE: NEGATIVE

## 2023-05-09 LAB
LAB AP GYN PRIMARY INTERPRETATION: NORMAL
Lab: NORMAL

## 2023-05-12 ENCOUNTER — APPOINTMENT (OUTPATIENT)
Dept: LAB | Facility: HOSPITAL | Age: 53
End: 2023-05-12

## 2023-05-12 DIAGNOSIS — E03.9 ACQUIRED HYPOTHYROIDISM: ICD-10-CM

## 2023-05-12 LAB — TSH SERPL DL<=0.05 MIU/L-ACNC: 1.45 UIU/ML (ref 0.45–4.5)

## 2023-07-07 ENCOUNTER — OCCMED (OUTPATIENT)
Dept: URGENT CARE | Facility: CLINIC | Age: 53
End: 2023-07-07

## 2023-07-07 ENCOUNTER — APPOINTMENT (OUTPATIENT)
Dept: LAB | Facility: CLINIC | Age: 53
End: 2023-07-07

## 2023-07-07 DIAGNOSIS — Z02.1 PHYSICAL EXAM, PRE-EMPLOYMENT: ICD-10-CM

## 2023-07-07 DIAGNOSIS — Z02.1 PHYSICAL EXAM, PRE-EMPLOYMENT: Primary | ICD-10-CM

## 2023-07-07 LAB
MEV IGG SER QL IA: NORMAL
MUV IGG SER QL IA: NORMAL
RUBV IGG SERPL IA-ACNC: 14.2 IU/ML
VZV IGG SER QL IA: NORMAL

## 2023-07-07 PROCEDURE — 86735 MUMPS ANTIBODY: CPT

## 2023-07-07 PROCEDURE — 86762 RUBELLA ANTIBODY: CPT

## 2023-07-07 PROCEDURE — 36415 COLL VENOUS BLD VENIPUNCTURE: CPT

## 2023-07-07 PROCEDURE — 86480 TB TEST CELL IMMUN MEASURE: CPT

## 2023-07-07 PROCEDURE — 86765 RUBEOLA ANTIBODY: CPT

## 2023-07-07 PROCEDURE — 86787 VARICELLA-ZOSTER ANTIBODY: CPT

## 2023-07-10 LAB
GAMMA INTERFERON BACKGROUND BLD IA-ACNC: 0.03 IU/ML
M TB IFN-G BLD-IMP: NEGATIVE
M TB IFN-G CD4+ BCKGRND COR BLD-ACNC: 0 IU/ML
M TB IFN-G CD4+ BCKGRND COR BLD-ACNC: 0 IU/ML
MITOGEN IGNF BCKGRD COR BLD-ACNC: >10 IU/ML

## 2023-08-04 DIAGNOSIS — E03.9 ACQUIRED HYPOTHYROIDISM: ICD-10-CM

## 2023-08-04 RX ORDER — LEVOTHYROXINE SODIUM 0.05 MG/1
50 TABLET ORAL DAILY
Qty: 90 TABLET | Refills: 3 | Status: SHIPPED | OUTPATIENT
Start: 2023-08-04

## 2023-11-06 ENCOUNTER — OFFICE VISIT (OUTPATIENT)
Dept: FAMILY MEDICINE CLINIC | Facility: CLINIC | Age: 53
End: 2023-11-06
Payer: COMMERCIAL

## 2023-11-06 VITALS
DIASTOLIC BLOOD PRESSURE: 65 MMHG | SYSTOLIC BLOOD PRESSURE: 128 MMHG | HEART RATE: 67 BPM | WEIGHT: 184.2 LBS | BODY MASS INDEX: 33.9 KG/M2 | OXYGEN SATURATION: 98 % | HEIGHT: 62 IN

## 2023-11-06 DIAGNOSIS — Z12.11 SCREENING FOR COLON CANCER: ICD-10-CM

## 2023-11-06 DIAGNOSIS — Z23 ENCOUNTER FOR IMMUNIZATION: ICD-10-CM

## 2023-11-06 DIAGNOSIS — Z12.31 ENCOUNTER FOR SCREENING MAMMOGRAM FOR MALIGNANT NEOPLASM OF BREAST: ICD-10-CM

## 2023-11-06 DIAGNOSIS — E03.9 ACQUIRED HYPOTHYROIDISM: ICD-10-CM

## 2023-11-06 DIAGNOSIS — M17.12 PRIMARY OSTEOARTHRITIS OF LEFT KNEE: ICD-10-CM

## 2023-11-06 DIAGNOSIS — Z00.00 ANNUAL PHYSICAL EXAM: ICD-10-CM

## 2023-11-06 DIAGNOSIS — R79.89 LOW SERUM VITAMIN D: Primary | ICD-10-CM

## 2023-11-06 PROCEDURE — 90471 IMMUNIZATION ADMIN: CPT | Performed by: NURSE PRACTITIONER

## 2023-11-06 PROCEDURE — 99396 PREV VISIT EST AGE 40-64: CPT | Performed by: NURSE PRACTITIONER

## 2023-11-06 PROCEDURE — 90686 IIV4 VACC NO PRSV 0.5 ML IM: CPT | Performed by: NURSE PRACTITIONER

## 2023-11-06 NOTE — PROGRESS NOTES
ADULT ANNUAL [address] [facility] PRIMARY CARE    NAME: Brannon Velasquez  AGE: 47 y.o. SEX: female  : 1970     DATE: 2023     Assessment and Plan:     Problem List Items Addressed This Visit        Endocrine    Acquired hypothyroidism     Recent TSH done in  was in normal range. Musculoskeletal and Integument    Primary osteoarthritis of left knee     Continues to wear left knee brace at work for support and injury prevention. Other    Low serum vitamin D - Primary     Continues with daily vit D supplementation. Other Visit Diagnoses     Annual physical exam        BMI 33.0-33.9,adult        Encounter for screening mammogram for malignant neoplasm of breast        Relevant Orders    Mammo screening bilateral w 3d & cad    Screening for colon cancer        Relevant Orders    Cologuard    Encounter for immunization        Relevant Orders    influenza vaccine, quadrivalent, 0.5 mL, preservative-free, for adult and pediatric patients 6 mos+ (AFLURIA, FLUARIX, FLULAVAL, FLUZONE) (Completed)          Immunizations and preventive care screenings were discussed with patient today. Appropriate education was printed on patient's after visit summary. Counseling:  Chronic issues    BMI Counseling: Body mass index is 33.69 kg/m². The BMI is above normal. Nutrition recommendations include encouraging healthy choices of fruits and vegetables. Exercise recommendations include moderate physical activity 150 minutes/week. Rationale for BMI follow-up plan is due to patient being overweight or obese. Depression Screening and Follow-up Plan: Patient was screened for depression during today's encounter. They screened negative with a PHQ-2 score of 0. Agreeable to cologuard. New mammogram order placed for mammogram the end of 2024. Return in 1 year (on 2024).      Chief Complaint:     Chief Complaint   Patient presents with   • Physical Exam   • Care Gap Request     Flu vaccine due - patient wants today   Colorectal screening due - cologuard preferred   Hep C and HIV screening due         History of Present Illness:     Adult Annual Physical   Patient here for a comprehensive physical exam. The patient reports no problems. Diet and Physical Activity  Diet/Nutrition: well balanced diet. Exercise: walking. Depression Screening  PHQ-2/9 Depression Screening    Little interest or pleasure in doing things: 0 - not at all  Feeling down, depressed, or hopeless: 0 - not at all  PHQ-2 Score: 0  PHQ-2 Interpretation: Negative depression screen       General Health  Sleep: sleeps well. Hearing: normal - bilateral.  Vision: no vision problems, goes for regular eye exams, and wears glasses. Dental: regular dental visits. /GYN Health  Patient is: perimenopausal  .    Advanced Care Planning  Do you have an advanced directive? no  Do you have a durable medical power of ? no     Review of Systems:     Review of Systems   Constitutional: Negative. HENT: Negative. Respiratory: Negative. Cardiovascular: Negative. Gastrointestinal: Negative. Neurological: Negative. All other systems reviewed and are negative.      Past Medical History:     Past Medical History:   Diagnosis Date   • Hypothyroidism       Past Surgical History:     Past Surgical History:   Procedure Laterality Date   •  SECTION        Social History:     Social History     Socioeconomic History   • Marital status: Single     Spouse name: None   • Number of children: None   • Years of education: None   • Highest education level: None   Occupational History   • None   Tobacco Use   • Smoking status: Former     Types: Cigarettes     Quit date: 2007     Years since quittin.1   • Smokeless tobacco: Never   Vaping Use   • Vaping Use: Never used   Substance and Sexual Activity   • Alcohol use: Yes     Comment: Little   • Drug use: No   • Sexual activity: Not Currently   Other Topics Concern   • None   Social History Narrative   • None     Social Determinants of Health     Financial Resource Strain: Not on file   Food Insecurity: Not on file   Transportation Needs: Not on file   Physical Activity: Not on file   Stress: Not on file   Social Connections: Not on file   Intimate Partner Violence: Not on file   Housing Stability: Not on file      Family History:     Family History   Problem Relation Age of Onset   • Colon cancer Mother    • COPD Mother    • Heart disease Father    • COPD Father    • Thyroid disease unspecified Sister    • Diabetes Sister    • No Known Problems Maternal Grandmother    • No Known Problems Maternal Grandfather    • No Known Problems Paternal Grandmother    • No Known Problems Paternal Grandfather    • No Known Problems Daughter    • No Known Problems Maternal Aunt    • No Known Problems Maternal Aunt    • No Known Problems Maternal Aunt    • No Known Problems Paternal Aunt    • No Known Problems Paternal Aunt    • Breast cancer Neg Hx    • Breast cancer additional onset Neg Hx    • Ovarian cancer Neg Hx    • BRCA2 Positive Neg Hx    • BRCA2 Negative Neg Hx    • BRCA1 Positive Neg Hx    • BRCA 1/2 Neg Hx    • BRCA1 Negative Neg Hx    • Endometrial cancer Neg Hx       Current Medications:     Current Outpatient Medications   Medication Sig Dispense Refill   • cholecalciferol (VITAMIN D3) 1,000 units tablet Take 1,000 Units by mouth daily     • ibuprofen (MOTRIN) 200 mg tablet Take by mouth every 6 (six) hours as needed for mild pain     • levothyroxine 50 mcg tablet Take 1 tablet (50 mcg total) by mouth daily 90 tablet 3   • Multiple Vitamin (Multi-Vitamin Daily) TABS Take by mouth     • omega-3-acid ethyl esters (LOVAZA) 1 g capsule Take 2 g by mouth 2 (two) times a day       No current facility-administered medications for this visit.       Allergies:     No Known Allergies   Physical Exam:     /65 (BP Location: Left arm, Patient Position: Sitting, Cuff Size: Large)   Pulse 67   Ht 5' 2" (1.575 m)   Wt 83.6 kg (184 lb 3.2 oz)   SpO2 98%   BMI 33.69 kg/m²     Physical Exam  Constitutional:       Appearance: Normal appearance. Cardiovascular:      Rate and Rhythm: Normal rate and regular rhythm. Heart sounds: Normal heart sounds. Pulmonary:      Effort: Pulmonary effort is normal.      Breath sounds: Normal breath sounds. Musculoskeletal:      Cervical back: Neck supple. Lymphadenopathy:      Cervical: No cervical adenopathy. Neurological:      Mental Status: She is alert and oriented to person, place, and time. Psychiatric:         Mood and Affect: Mood normal.         Behavior: Behavior normal.         Thought Content:  Thought content normal.          JANNETTE Block  [facility] PRIMARY CARE

## 2023-11-06 NOTE — PATIENT INSTRUCTIONS
Wellness Visit for Adults   AMBULATORY CARE:   A wellness visit  is when you see your healthcare provider to get screened for health problems. Your healthcare provider will also give you advice on how to stay healthy. Write down your questions so you remember to ask them. Ask your healthcare provider how often you should have a wellness visit. What happens at a wellness visit:  Your healthcare provider will ask about your health, and your family history of health problems. This includes high blood pressure, heart disease, and cancer. He or she will ask if you have symptoms that concern you, if you smoke, and about your mood. You may also be asked about your intake of medicines, supplements, food, and alcohol. Any of the following may be done: Your weight  will be checked. Your height may also be checked so your body mass index (BMI) can be calculated. Your BMI shows if you are at a healthy weight. Your blood pressure  and heart rate will be checked. Your temperature may also be checked. Blood and urine tests  may be done. Blood tests may be done to check your cholesterol levels. Abnormal cholesterol levels increase your risk for heart disease and stroke. You may also need a blood or urine test to check for diabetes if you are at increased risk. Urine tests may be done to look for signs of an infection or kidney disease. A physical exam  includes checking your heartbeat and lungs with a stethoscope. Your healthcare provider may also check your skin to look for sun damage. Screening tests  may be recommended. A screening test is done to check for diseases that may not cause symptoms. The screening tests you may need depend on your age, gender, family history, and lifestyle habits. For example, colorectal screening may be recommended if you are 48years old or older. Screening tests you need if you are a woman:   A Pap smear  is used to screen for cervical cancer.  Pap smears are usually done every 3 to 5 years depending on your age. You may need them more often if you have had abnormal Pap smear test results in the past. Ask your healthcare provider how often you should have a Pap smear. A mammogram  is an x-ray of your breasts to screen for breast cancer. Experts recommend mammograms every 2 years starting at age 48 years. You may need a mammogram at age 52 years or younger if you have an increased risk for breast cancer. Talk to your healthcare provider about when you should start having mammograms and how often you need them. Vaccines you may need:   Get an influenza vaccine  every year. The influenza vaccine protects you from the flu. Several types of viruses cause the flu. The viruses change over time, so new vaccines are made each year. Get a tetanus-diphtheria (Td) booster vaccine  every 10 years. This vaccine protects you against tetanus and diphtheria. Tetanus is a severe infection that may cause painful muscle spasms and lockjaw. Diphtheria is a severe bacterial infection that causes a thick covering in the back of your mouth and throat. Get a human papillomavirus (HPV) vaccine  if you are female and aged 23 to 32 or male 23 to 24 and never received it. This vaccine protects you from HPV infection. HPV is the most common infection spread by sexual contact. HPV may also cause vaginal, penile, and anal cancers. Get a pneumococcal vaccine  if you are aged 72 years or older. The pneumococcal vaccine is an injection given to protect you from pneumococcal disease. Pneumococcal disease is an infection caused by pneumococcal bacteria. The infection may cause pneumonia, meningitis, or an ear infection. Get a shingles vaccine  if you are 60 or older, even if you have had shingles before. The shingles vaccine is an injection to protect you from the varicella-zoster virus. This is the same virus that causes chickenpox.  Shingles is a painful rash that develops in people who had chickenpox or have been exposed to the virus. How to eat healthy:  My Plate is a model for planning healthy meals. It shows the types and amounts of foods that should go on your plate. Fruits and vegetables make up about half of your plate, and grains and protein make up the other half. A serving of dairy is included on the side of your plate. The amount of calories and serving sizes you need depends on your age, gender, weight, and height. Examples of healthy foods are listed below:  Eat a variety of vegetables  such as dark green, red, and orange vegetables. You can also include canned vegetables low in sodium (salt) and frozen vegetables without added butter or sauces. Eat a variety of fresh fruits , canned fruit in 100% juice, frozen fruit, and dried fruit. Include whole grains. At least half of the grains you eat should be whole grains. Examples include whole-wheat bread, wheat pasta, brown rice, and whole-grain cereals such as oatmeal.    Eat a variety of protein foods such as seafood (fish and shellfish), lean meat, and poultry without skin (turkey and chicken). Examples of lean meats include pork leg, shoulder, or tenderloin, and beef round, sirloin, tenderloin, and extra lean ground beef. Other protein foods include eggs and egg substitutes, beans, peas, soy products, nuts, and seeds. Choose low-fat dairy products such as skim or 1% milk or low-fat yogurt, cheese, and cottage cheese. Limit unhealthy fats  such as butter, hard margarine, and shortening. Exercise:  Exercise at least 30 minutes per day on most days of the week. Some examples of exercise include walking, biking, dancing, and swimming. You can also fit in more physical activity by taking the stairs instead of the elevator or parking farther away from stores. Include muscle strengthening activities 2 days each week. Regular exercise provides many health benefits.  It helps you manage your weight, and decreases your risk for type 2 diabetes, heart disease, stroke, and high blood pressure. Exercise can also help improve your mood. Ask your healthcare provider about the best exercise plan for you. General health and safety guidelines:   Do not smoke. Nicotine and other chemicals in cigarettes and cigars can cause lung damage. Ask your healthcare provider for information if you currently smoke and need help to quit. E-cigarettes or smokeless tobacco still contain nicotine. Talk to your healthcare provider before you use these products. Limit alcohol. A drink of alcohol is 12 ounces of beer, 5 ounces of wine, or 1½ ounces of liquor. Lose weight, if needed. Being overweight increases your risk of certain health conditions. These include heart disease, high blood pressure, type 2 diabetes, and certain types of cancer. Protect your skin. Do not sunbathe or use tanning beds. Use sunscreen with a SPF 15 or higher. Apply sunscreen at least 15 minutes before you go outside. Reapply sunscreen every 2 hours. Wear protective clothing, hats, and sunglasses when you are outside. Drive safely. Always wear your seatbelt. Make sure everyone in your car wears a seatbelt. A seatbelt can save your life if you are in an accident. Do not use your cell phone when you are driving. This could distract you and cause an accident. Pull over if you need to make a call or send a text message. Practice safe sex. Use latex condoms if are sexually active and have more than one partner. Your healthcare provider may recommend screening tests for sexually transmitted infections (STIs). Wear helmets, lifejackets, and protective gear. Always wear a helmet when you ride a bike or motorcycle, go skiing, or play sports that could cause a head injury. Wear protective equipment when you play sports. Wear a lifejacket when you are on a boat or doing water sports.     © Copyright Carlie Casanova 2023 Information is for End User's use only and may not be sold, redistributed or otherwise used for commercial purposes. The above information is an  only. It is not intended as medical advice for individual conditions or treatments. Talk to your doctor, nurse or pharmacist before following any medical regimen to see if it is safe and effective for you.

## 2023-11-29 ENCOUNTER — OFFICE VISIT (OUTPATIENT)
Dept: URGENT CARE | Facility: CLINIC | Age: 53
End: 2023-11-29
Payer: COMMERCIAL

## 2023-11-29 VITALS
HEIGHT: 62 IN | WEIGHT: 184 LBS | SYSTOLIC BLOOD PRESSURE: 108 MMHG | DIASTOLIC BLOOD PRESSURE: 66 MMHG | HEART RATE: 55 BPM | BODY MASS INDEX: 33.86 KG/M2 | OXYGEN SATURATION: 100 % | RESPIRATION RATE: 16 BRPM | TEMPERATURE: 97.8 F

## 2023-11-29 DIAGNOSIS — J01.00 ACUTE NON-RECURRENT MAXILLARY SINUSITIS: ICD-10-CM

## 2023-11-29 DIAGNOSIS — N30.01 ACUTE CYSTITIS WITH HEMATURIA: Primary | ICD-10-CM

## 2023-11-29 LAB
SL AMB  POCT GLUCOSE, UA: NEGATIVE
SL AMB LEUKOCYTE ESTERASE,UA: ABNORMAL
SL AMB POCT BILIRUBIN,UA: NEGATIVE
SL AMB POCT BLOOD,UA: ABNORMAL
SL AMB POCT CLARITY,UA: ABNORMAL
SL AMB POCT COLOR,UA: YELLOW
SL AMB POCT KETONES,UA: NEGATIVE
SL AMB POCT NITRITE,UA: NEGATIVE
SL AMB POCT PH,UA: 6.5
SL AMB POCT SPECIFIC GRAVITY,UA: 1.02
SL AMB POCT URINE PROTEIN: ABNORMAL
SL AMB POCT UROBILINOGEN: NEGATIVE

## 2023-11-29 PROCEDURE — 81002 URINALYSIS NONAUTO W/O SCOPE: CPT

## 2023-11-29 PROCEDURE — 87077 CULTURE AEROBIC IDENTIFY: CPT

## 2023-11-29 PROCEDURE — 99213 OFFICE O/P EST LOW 20 MIN: CPT

## 2023-11-29 PROCEDURE — 87086 URINE CULTURE/COLONY COUNT: CPT

## 2023-11-29 PROCEDURE — 87186 SC STD MICRODIL/AGAR DIL: CPT

## 2023-11-29 RX ORDER — PREDNISONE 20 MG/1
40 TABLET ORAL DAILY
Qty: 10 TABLET | Refills: 0 | Status: SHIPPED | OUTPATIENT
Start: 2023-11-29 | End: 2023-12-04

## 2023-11-29 RX ORDER — CEPHALEXIN 500 MG/1
500 CAPSULE ORAL EVERY 12 HOURS SCHEDULED
Qty: 14 CAPSULE | Refills: 0 | Status: SHIPPED | OUTPATIENT
Start: 2023-11-29 | End: 2023-12-06

## 2023-11-29 NOTE — PATIENT INSTRUCTIONS
Urine culture pending  Take antibiotic as prescribed  Take oral steroids as prescribed  Continue with supportive measures, OTC Tylenol/Ibuprofen, nasal decongestants, and cough suppressants   Cool mist humidifiers, throat lozenges, increased fluid intake and rest   Follow up with PCP in 3-5 days  Present to ER if symptoms worsen     Sinusitis   AMBULATORY CARE:   Sinusitis  is inflammation or infection of your sinuses. Sinusitis is most often caused by a virus. Acute sinusitis may last up to 12 weeks. Chronic sinusitis lasts longer than 12 weeks. Recurrent sinusitis means you have 4 or more infections in 1 year. Common signs and symptoms:   Fever    Pain, pressure, redness, or swelling around the forehead, cheeks, or eyes    Thick yellow or green discharge from your nose    Tenderness when you touch your face over your sinuses    Dry cough that happens mostly at night or when you lie down    Headache and face pain that is worse when you lean forward    Tooth pain, or pain when you chew    Seek care immediately if:   You have trouble breathing or wheezing that is getting worse. You have a stiff neck, a fever, or a bad headache. You cannot open your eye. Your eyeball bulges out or you cannot move your eye. You are more sleepy than normal, or you notice changes in your ability to think, move, or talk. You have swelling of your forehead or scalp. Call your doctor if:   You have vision changes, such as double vision. Your eye and eyelid are red, swollen, and painful. Your symptoms do not improve or go away after 10 days. You have nausea and are vomiting. Your nose is bleeding. You have questions or concerns about your condition or care. Medicines: Your symptoms may go away on their own. Your healthcare provider may recommend watchful waiting for up to 10 days before starting antibiotics. You may need any of the following:  Acetaminophen  decreases pain and fever.  It is available without a doctor's order. Ask how much to take and how often to take it. Follow directions. Read the labels of all other medicines you are using to see if they also contain acetaminophen, or ask your doctor or pharmacist. Acetaminophen can cause liver damage if not taken correctly. NSAIDs , such as ibuprofen, help decrease swelling, pain, and fever. This medicine is available with or without a doctor's order. NSAIDs can cause stomach bleeding or kidney problems in certain people. If you take blood thinner medicine, always ask your healthcare provider if NSAIDs are safe for you. Always read the medicine label and follow directions. Nasal steroid sprays  may help decrease inflammation in your nose and sinuses. Decongestants  help reduce swelling and drain mucus in the nose and sinuses. They may help you breathe easier. Antihistamines  help dry mucus in the nose and relieve sneezing. Antibiotics  help treat or prevent a bacterial infection. Self-care:   Rinse your sinuses as directed. Use a sinus rinse device to rinse your nasal passages with a saline (salt water) solution or distilled water. Do not use tap water. This will help thin the mucus in your nose and rinse away pollen and dirt. It will also help reduce swelling so you can breathe normally. Use a humidifier  to increase air moisture in your home. This may make it easier for you to breathe and help decrease your cough. Sleep with your head elevated. Place an extra pillow under your head before you go to sleep to help your sinuses drain. Drink liquids as directed. Ask your healthcare provider how much liquid to drink each day and which liquids are best for you. Liquids will thin the mucus in your nose and help it drain. Avoid drinks that contain alcohol or caffeine. Do not smoke, and avoid secondhand smoke. Nicotine and other chemicals in cigarettes and cigars can make your symptoms worse.  Ask your healthcare provider for information if you currently smoke and need help to quit. E-cigarettes or smokeless tobacco still contain nicotine. Talk to your healthcare provider before you use these products. Prevent the spread of germs:   Wash your hands often with soap and water. Wash your hands after you use the bathroom, change a child's diaper, or sneeze. Wash your hands before you prepare or eat food. Stay away from people who are sick. Some germs spread easily and quickly through contact. Follow up with your doctor as directed: You may be referred to an ear, nose, and throat specialist. Write down your questions so you remember to ask them during your visits. © Copyright [facility] 2023 Information is for End User's use only and may not be sold, redistributed or otherwise used for commercial purposes. The above information is an  only. It is not intended as medical advice for individual conditions or treatments. Talk to your doctor, nurse or pharmacist before following any medical regimen to see if it is safe and effective for you. Urinary Tract Infection in Women   AMBULATORY CARE:   A urinary tract infection (UTI)  is caused by bacteria that get inside your urinary tract. Your urinary tract includes your kidneys, ureters, bladder, and urethra. A UTI is more common in your lower urinary tract, which includes your bladder and urethra. Common symptoms include the following:   Urinating more often or waking from sleep to urinate    Pain or burning when you urinate    Pain or pressure in your lower abdomen and back    Urine that smells bad    Blood in your urine    Leaking urine    Seek care immediately if:   You are urinating very little or not at all. You have a high fever with shaking chills. You have side or back pain that gets worse. Call your doctor if:   You have a fever. You do not feel better after 2 days of taking antibiotics.     You have new symptoms, such as blood or pus in your urine. You are vomiting. You have questions or concerns about your condition or care. Treatment for a UTI  may include antibiotics to treat a bacterial infection. You may also need medicines to decrease pain and burning, or decrease the urge to urinate often. If you have UTIs often (called recurrent UTIs), you may be given antibiotics to take regularly. You will be given directions for when and how to use antibiotics. The goal is to prevent UTIs but not cause antibiotic resistance by using antibiotics too often. Prevent a UTI:   Empty your bladder often. Urinate and empty your bladder as soon as you feel the need. Do not hold your urine for long periods of time. Wipe from front to back after you urinate or have a bowel movement. This will help prevent germs from getting into your urinary tract through your urethra. Drink liquids as directed. Ask how much liquid to drink each day and which liquids are best for you. You may need to drink more liquids than usual to help flush out the bacteria. Do not drink alcohol, caffeine, or citrus juices. These can irritate your bladder and increase your symptoms. Your healthcare provider may recommend cranberry juice to help prevent a UTI. Urinate before and after you have sex. This can help flush out bacteria passed during sex. Do not douche or use feminine deodorants. These can change the chemical balance in your vagina. Change sanitary pads or tampons often. This will help prevent germs from getting into your urinary tract. Talk to your healthcare provider about your birth control method. You may need to change your method if it is increasing your risk for UTIs. Wear cotton underwear and clothes that are loose. Tight pants and nylon underwear can trap moisture and cause bacteria to grow. Vaginal estrogen may be recommended.   This medicine helps prevent UTIs in women who have gone through menopause or are in beto-menopause. Do pelvic muscle exercises often. Pelvic muscle exercises may help you start and stop urinating. Strong pelvic muscles may help you empty your bladder easier. Squeeze these muscles tightly for 5 seconds like you are trying to hold back urine. Then relax for 5 seconds. Gradually work up to squeezing for 10 seconds. Do 3 sets of 15 repetitions a day, or as directed. Follow up with your doctor as directed:  Write down your questions so you remember to ask them during your visits. © Copyright [facility] [place] 2023 Information is for End User's use only and may not be sold, redistributed or otherwise used for commercial purposes. The above information is an  only. It is not intended as medical advice for individual conditions or treatments. Talk to your doctor, nurse or pharmacist before following any medical regimen to see if it is safe and effective for you.

## 2023-11-29 NOTE — PROGRESS NOTES
[facility]        NAME: Blanquita Galan is a 47 y.o. female  : 1970    MRN: [id number]  DATE: 2023  TIME: 9:59 AM    Assessment and Plan   Acute cystitis with hematuria [N30.01]  1. Acute cystitis with hematuria  POCT urine dip    Urine culture    cephalexin (KEFLEX) 500 mg capsule      2. Acute non-recurrent maxillary sinusitis  predniSONE 20 mg tablet        Urine dip showing moderate blood and small leukocytes. Urine culture pending. Will treat with Cephalexin. Oral steroids for sinusitis. Encouraged continued supportive measures. Follow up with PCP in 3-5 days or proceed to emergency department for worsening symptoms. Patient verbalized understanding of instructions given. Patient Instructions     Patient Instructions   Urine culture pending  Take antibiotic as prescribed  Take oral steroids as prescribed  Continue with supportive measures, OTC Tylenol/Ibuprofen, nasal decongestants, and cough suppressants   Cool mist humidifiers, throat lozenges, increased fluid intake and rest   Follow up with PCP in 3-5 days  Present to ER if symptoms worsen     Sinusitis   AMBULATORY CARE:   Sinusitis  is inflammation or infection of your sinuses. Sinusitis is most often caused by a virus. Acute sinusitis may last up to 12 weeks. Chronic sinusitis lasts longer than 12 weeks. Recurrent sinusitis means you have 4 or more infections in 1 year. Common signs and symptoms:   Fever    Pain, pressure, redness, or swelling around the forehead, cheeks, or eyes    Thick yellow or green discharge from your nose    Tenderness when you touch your face over your sinuses    Dry cough that happens mostly at night or when you lie down    Headache and face pain that is worse when you lean forward    Tooth pain, or pain when you chew    Seek care immediately if:   You have trouble breathing or wheezing that is getting worse. You have a stiff neck, a fever, or a bad headache.      You cannot open your eye. Your eyeball bulges out or you cannot move your eye. You are more sleepy than normal, or you notice changes in your ability to think, move, or talk. You have swelling of your forehead or scalp. Call your doctor if:   You have vision changes, such as double vision. Your eye and eyelid are red, swollen, and painful. Your symptoms do not improve or go away after 10 days. You have nausea and are vomiting. Your nose is bleeding. You have questions or concerns about your condition or care. Medicines: Your symptoms may go away on their own. Your healthcare provider may recommend watchful waiting for up to 10 days before starting antibiotics. You may need any of the following:  Acetaminophen  decreases pain and fever. It is available without a doctor's order. Ask how much to take and how often to take it. Follow directions. Read the labels of all other medicines you are using to see if they also contain acetaminophen, or ask your doctor or pharmacist. Acetaminophen can cause liver damage if not taken correctly. NSAIDs , such as ibuprofen, help decrease swelling, pain, and fever. This medicine is available with or without a doctor's order. NSAIDs can cause stomach bleeding or kidney problems in certain people. If you take blood thinner medicine, always ask your healthcare provider if NSAIDs are safe for you. Always read the medicine label and follow directions. Nasal steroid sprays  may help decrease inflammation in your nose and sinuses. Decongestants  help reduce swelling and drain mucus in the nose and sinuses. They may help you breathe easier. Antihistamines  help dry mucus in the nose and relieve sneezing. Antibiotics  help treat or prevent a bacterial infection. Self-care:   Rinse your sinuses as directed. Use a sinus rinse device to rinse your nasal passages with a saline (salt water) solution or distilled water. Do not use tap water.  This will help thin the mucus in your nose and rinse away pollen and dirt. It will also help reduce swelling so you can breathe normally. Use a humidifier  to increase air moisture in your home. This may make it easier for you to breathe and help decrease your cough. Sleep with your head elevated. Place an extra pillow under your head before you go to sleep to help your sinuses drain. Drink liquids as directed. Ask your healthcare provider how much liquid to drink each day and which liquids are best for you. Liquids will thin the mucus in your nose and help it drain. Avoid drinks that contain alcohol or caffeine. Do not smoke, and avoid secondhand smoke. Nicotine and other chemicals in cigarettes and cigars can make your symptoms worse. Ask your healthcare provider for information if you currently smoke and need help to quit. E-cigarettes or smokeless tobacco still contain nicotine. Talk to your healthcare provider before you use these products. Prevent the spread of germs:   Wash your hands often with soap and water. Wash your hands after you use the bathroom, change a child's diaper, or sneeze. Wash your hands before you prepare or eat food. Stay away from people who are sick. Some germs spread easily and quickly through contact. Follow up with your doctor as directed: You may be referred to an ear, nose, and throat specialist. Write down your questions so you remember to ask them during your visits. © Copyright [facility] 2023 Information is for End User's use only and may not be sold, redistributed or otherwise used for commercial purposes. The above information is an  only. It is not intended as medical advice for individual conditions or treatments. Talk to your doctor, nurse or pharmacist before following any medical regimen to see if it is safe and effective for you.   Urinary Tract Infection in Women   AMBULATORY CARE:   A urinary tract infection (UTI)  is caused by bacteria that get inside your urinary tract. Your urinary tract includes your kidneys, ureters, bladder, and urethra. A UTI is more common in your lower urinary tract, which includes your bladder and urethra. Common symptoms include the following:   Urinating more often or waking from sleep to urinate    Pain or burning when you urinate    Pain or pressure in your lower abdomen and back    Urine that smells bad    Blood in your urine    Leaking urine    Seek care immediately if:   You are urinating very little or not at all. You have a high fever with shaking chills. You have side or back pain that gets worse. Call your doctor if:   You have a fever. You do not feel better after 2 days of taking antibiotics. You have new symptoms, such as blood or pus in your urine. You are vomiting. You have questions or concerns about your condition or care. Treatment for a UTI  may include antibiotics to treat a bacterial infection. You may also need medicines to decrease pain and burning, or decrease the urge to urinate often. If you have UTIs often (called recurrent UTIs), you may be given antibiotics to take regularly. You will be given directions for when and how to use antibiotics. The goal is to prevent UTIs but not cause antibiotic resistance by using antibiotics too often. Prevent a UTI:   Empty your bladder often. Urinate and empty your bladder as soon as you feel the need. Do not hold your urine for long periods of time. Wipe from front to back after you urinate or have a bowel movement. This will help prevent germs from getting into your urinary tract through your urethra. Drink liquids as directed. Ask how much liquid to drink each day and which liquids are best for you. You may need to drink more liquids than usual to help flush out the bacteria. Do not drink alcohol, caffeine, or citrus juices. These can irritate your bladder and increase your symptoms.  Your healthcare provider may recommend cranberry juice to help prevent a UTI. Urinate before and after you have sex. This can help flush out bacteria passed during sex. Do not douche or use feminine deodorants. These can change the chemical balance in your vagina. Change sanitary pads or tampons often. This will help prevent germs from getting into your urinary tract. Talk to your healthcare provider about your birth control method. You may need to change your method if it is increasing your risk for UTIs. Wear cotton underwear and clothes that are loose. Tight pants and nylon underwear can trap moisture and cause bacteria to grow. Vaginal estrogen may be recommended. This medicine helps prevent UTIs in women who have gone through menopause or are in beto-menopause. Do pelvic muscle exercises often. Pelvic muscle exercises may help you start and stop urinating. Strong pelvic muscles may help you empty your bladder easier. Squeeze these muscles tightly for 5 seconds like you are trying to hold back urine. Then relax for 5 seconds. Gradually work up to squeezing for 10 seconds. Do 3 sets of 15 repetitions a day, or as directed. Follow up with your doctor as directed:  Write down your questions so you remember to ask them during your visits. © Copyright [facility] 2023 Information is for End User's use only and may not be sold, redistributed or otherwise used for commercial purposes. The above information is an  only. It is not intended as medical advice for individual conditions or treatments. Talk to your doctor, nurse or pharmacist before following any medical regimen to see if it is safe and effective for you. Chief Complaint     Chief Complaint   Patient presents with    Cold Like Symptoms     C/o nasal congestion, sore throat, mild cough. Onset 7 days ago.  "Feel like it is coming down closer to my chest.    Possible UTI     C/o frequency of urination in small amounts with burning upon urination. History of Present Illness       31-year-old female with no significant past medical history presents with multiple complaints. Patient reporting ongoing nasal congestion, sinus pressure, and cough x 1 week. Mild sore throat. No fever, chills, vomiting, or diarrhea. She has been taking multiple OTC medications for her symptoms. States positive sick contact/exposures as other family members recently ill with similar symptoms. She is also reporting dysuria and urinary frequency x 2 days. No flank pain, back pain, abdominal pain, nausea, or vomiting. No fever or chills. Perimenopausal, no LMP. Review of Systems   Review of Systems   Constitutional:  Negative for chills and fever. HENT:  Positive for congestion, rhinorrhea, sinus pressure, sinus pain and sore throat. Negative for ear discharge, ear pain, trouble swallowing and voice change. Eyes:  Negative for discharge. Respiratory:  Positive for cough. Negative for shortness of breath and wheezing. Cardiovascular:  Negative for chest pain. Gastrointestinal:  Negative for abdominal pain, diarrhea, nausea and vomiting. Genitourinary:  Positive for difficulty urinating, dysuria and frequency. Negative for flank pain and hematuria. Musculoskeletal:  Negative for back pain and myalgias. Skin:  Negative for rash.          Current Medications       Current Outpatient Medications:     cephalexin (KEFLEX) 500 mg capsule, Take 1 capsule (500 mg total) by mouth every 12 (twelve) hours for 7 days, Disp: 14 capsule, Rfl: 0    cholecalciferol (VITAMIN D3) 1,000 units tablet, Take 1,000 Units by mouth daily, Disp: , Rfl:     levothyroxine 50 mcg tablet, Take 1 tablet (50 mcg total) by mouth daily, Disp: 90 tablet, Rfl: 3    Multiple Vitamin (Multi-Vitamin Daily) TABS, Take by mouth, Disp: , Rfl:     omega-3-acid ethyl esters (LOVAZA) 1 g capsule, Take 2 g by mouth 2 (two) times a day, Disp: , Rfl:     predniSONE 20 mg tablet, Take 2 tablets (40 mg total) by mouth daily for 5 days, Disp: 10 tablet, Rfl: 0    ibuprofen (MOTRIN) 200 mg tablet, Take by mouth every 6 (six) hours as needed for mild pain (Patient not taking: Reported on 2023), Disp: , Rfl:     Current Allergies     Allergies as of 2023    (No Known Allergies)            The following portions of the patient's history were reviewed and updated as appropriate: allergies, current medications, past family history, past medical history, past social history, past surgical history and problem list.     Past Medical History:   Diagnosis Date    Disease of thyroid gland     Hypothyroidism        Past Surgical History:   Procedure Laterality Date     SECTION         Family History   Problem Relation Age of Onset    Colon cancer Mother     COPD Mother     Heart disease Father     COPD Father     Thyroid disease unspecified Sister     Diabetes Sister     No Known Problems Maternal Grandmother     No Known Problems Maternal Grandfather     No Known Problems Paternal Grandmother     No Known Problems Paternal Grandfather     No Known Problems Daughter     No Known Problems Maternal Aunt     No Known Problems Maternal Aunt     No Known Problems Maternal Aunt     No Known Problems Paternal Aunt     No Known Problems Paternal Aunt     Breast cancer Neg Hx     Breast cancer additional onset Neg Hx     Ovarian cancer Neg Hx     BRCA2 Positive Neg Hx     BRCA2 Negative Neg Hx     BRCA1 Positive Neg Hx     BRCA 1/2 Neg Hx     BRCA1 Negative Neg Hx     Endometrial cancer Neg Hx          Medications have been verified. Objective   /66   Pulse 55   Temp 97.8 °F (36.6 °C)   Resp 16   Ht 5' 2" (1.575 m)   Wt 83.5 kg (184 lb)   SpO2 100%   BMI 33.65 kg/m²   No LMP recorded. Patient is perimenopausal.       Physical Exam     Physical Exam  Vitals and nursing note reviewed. Constitutional:       General: She is not in acute distress.      Appearance: She is not toxic-appearing. HENT:      Head: Normocephalic. Right Ear: Tympanic membrane, ear canal and external ear normal.      Left Ear: Tympanic membrane, ear canal and external ear normal.      Nose: Congestion present. Right Sinus: Maxillary sinus tenderness present. No frontal sinus tenderness. Left Sinus: Maxillary sinus tenderness present. No frontal sinus tenderness. Mouth/Throat:      Mouth: Mucous membranes are moist.      Pharynx: Posterior oropharyngeal erythema present. Eyes:      Conjunctiva/sclera: Conjunctivae normal.   Cardiovascular:      Rate and Rhythm: Normal rate and regular rhythm. Heart sounds: Normal heart sounds. Pulmonary:      Effort: Pulmonary effort is normal. No respiratory distress. Breath sounds: Normal breath sounds. No stridor. No wheezing, rhonchi or rales. Abdominal:      General: Bowel sounds are normal.      Palpations: Abdomen is soft. Tenderness: There is no abdominal tenderness. There is no right CVA tenderness or left CVA tenderness. Lymphadenopathy:      Cervical: No cervical adenopathy. Skin:     General: Skin is warm and dry. Neurological:      Mental Status: She is alert and oriented to person, place, and time. Gait: Gait is intact.    Psychiatric:         Mood and Affect: Mood normal.         Behavior: Behavior normal.

## 2023-12-01 LAB
BACTERIA UR CULT: ABNORMAL
BACTERIA UR CULT: ABNORMAL

## 2023-12-06 LAB — COLOGUARD RESULT REPORTABLE: NEGATIVE

## 2024-07-19 DIAGNOSIS — E03.9 ACQUIRED HYPOTHYROIDISM: ICD-10-CM

## 2024-07-19 RX ORDER — LEVOTHYROXINE SODIUM 0.05 MG/1
50 TABLET ORAL DAILY
Qty: 30 TABLET | Refills: 0 | Status: SHIPPED | OUTPATIENT
Start: 2024-07-19

## 2024-08-13 DIAGNOSIS — E03.9 ACQUIRED HYPOTHYROIDISM: ICD-10-CM

## 2024-08-13 RX ORDER — LEVOTHYROXINE SODIUM 50 UG/1
50 TABLET ORAL DAILY
Qty: 90 TABLET | Refills: 0 | Status: SHIPPED | OUTPATIENT
Start: 2024-08-13

## 2024-11-06 ENCOUNTER — OFFICE VISIT (OUTPATIENT)
Dept: URGENT CARE | Facility: CLINIC | Age: 54
End: 2024-11-06
Payer: COMMERCIAL

## 2024-11-06 VITALS
OXYGEN SATURATION: 99 % | BODY MASS INDEX: 37.07 KG/M2 | TEMPERATURE: 97.4 F | HEART RATE: 59 BPM | SYSTOLIC BLOOD PRESSURE: 142 MMHG | WEIGHT: 188.8 LBS | DIASTOLIC BLOOD PRESSURE: 72 MMHG | RESPIRATION RATE: 16 BRPM | HEIGHT: 60 IN

## 2024-11-06 DIAGNOSIS — J02.9 ACUTE PHARYNGITIS, UNSPECIFIED ETIOLOGY: ICD-10-CM

## 2024-11-06 DIAGNOSIS — J01.00 ACUTE MAXILLARY SINUSITIS, RECURRENCE NOT SPECIFIED: Primary | ICD-10-CM

## 2024-11-06 LAB
SARS-COV-2 AG UPPER RESP QL IA: NEGATIVE
VALID CONTROL: NORMAL

## 2024-11-06 PROCEDURE — 99213 OFFICE O/P EST LOW 20 MIN: CPT | Performed by: PHYSICIAN ASSISTANT

## 2024-11-06 PROCEDURE — 87811 SARS-COV-2 COVID19 W/OPTIC: CPT | Performed by: PHYSICIAN ASSISTANT

## 2024-11-06 RX ORDER — AMOXICILLIN 500 MG/1
500 CAPSULE ORAL EVERY 8 HOURS SCHEDULED
Qty: 30 CAPSULE | Refills: 0 | Status: SHIPPED | OUTPATIENT
Start: 2024-11-06 | End: 2024-11-16

## 2024-11-06 NOTE — PROGRESS NOTES
[facility]        NAME: Kelsey Taveras is a 53 y.o. female  : 1970    MRN: [id number]  DATE: 2024  TIME: 12:15 PM    /72   Pulse 59   Temp (!) 97.4 °F (36.3 °C)   Resp 16   Ht 5' (1.524 m)   Wt 85.6 kg (188 lb 12.8 oz)   LMP  (LMP Unknown)   SpO2 99%   BMI 36.87 kg/m²     Assessment and Plan   Acute maxillary sinusitis, recurrence not specified [J01.00]  1. Acute maxillary sinusitis, recurrence not specified  Poct Covid 19 Rapid Antigen Test    amoxicillin (AMOXIL) 500 mg capsule      2. Acute pharyngitis, unspecified etiology  amoxicillin (AMOXIL) 500 mg capsule            Patient Instructions       Follow up with PCP in 3-5 days.  Proceed to  ER if symptoms worsen.    Chief Complaint     Chief Complaint   Patient presents with    Cold Like Symptoms     Sinus pressure and chills starting yesterday.          History of Present Illness       Pt with sinus pressure and congestion sore throat starting yesterday  + covid exposure         Review of Systems   Review of Systems   Constitutional: Negative.    HENT:  Positive for congestion, sinus pressure and sinus pain.    Eyes: Negative.    Respiratory: Negative.     Cardiovascular: Negative.    Gastrointestinal: Negative.    Endocrine: Negative.    Genitourinary: Negative.    Musculoskeletal: Negative.    Skin: Negative.    Allergic/Immunologic: Negative.    Neurological: Negative.    Hematological: Negative.    Psychiatric/Behavioral: Negative.     All other systems reviewed and are negative.        Current Medications       Current Outpatient Medications:     amoxicillin (AMOXIL) 500 mg capsule, Take 1 capsule (500 mg total) by mouth every 8 (eight) hours for 10 days, Disp: 30 capsule, Rfl: 0    cholecalciferol (VITAMIN D3) 1,000 units tablet, Take 1,000 Units by mouth daily, Disp: , Rfl:     levothyroxine 50 mcg tablet, Take 1 tablet by mouth once daily, Disp: 90 tablet, Rfl: 0    Multiple Vitamin (Multi-Vitamin Daily) TABS,  Take by mouth, Disp: , Rfl:     omega-3-acid ethyl esters (LOVAZA) 1 g capsule, Take 2 g by mouth 2 (two) times a day, Disp: , Rfl:     ibuprofen (MOTRIN) 200 mg tablet, Take by mouth every 6 (six) hours as needed for mild pain (Patient not taking: Reported on 2023), Disp: , Rfl:     Current Allergies     Allergies as of 2024    (No Known Allergies)            The following portions of the patient's history were reviewed and updated as appropriate: allergies, current medications, past family history, past medical history, past social history, past surgical history and problem list.     Past Medical History:   Diagnosis Date    Disease of thyroid gland     Hypothyroidism        Past Surgical History:   Procedure Laterality Date     SECTION         Family History   Problem Relation Age of Onset    Colon cancer Mother     COPD Mother     Heart disease Father     COPD Father     Thyroid disease unspecified Sister     Diabetes Sister     No Known Problems Maternal Grandmother     No Known Problems Maternal Grandfather     No Known Problems Paternal Grandmother     No Known Problems Paternal Grandfather     No Known Problems Daughter     No Known Problems Maternal Aunt     No Known Problems Maternal Aunt     No Known Problems Maternal Aunt     No Known Problems Paternal Aunt     No Known Problems Paternal Aunt     Breast cancer Neg Hx     Breast cancer additional onset Neg Hx     Ovarian cancer Neg Hx     BRCA2 Positive Neg Hx     BRCA2 Negative Neg Hx     BRCA1 Positive Neg Hx     BRCA 1/2 Neg Hx     BRCA1 Negative Neg Hx     Endometrial cancer Neg Hx          Medications have been verified.        Objective   /72   Pulse 59   Temp (!) 97.4 °F (36.3 °C)   Resp 16   Ht 5' (1.524 m)   Wt 85.6 kg (188 lb 12.8 oz)   LMP  (LMP Unknown)   SpO2 99%   BMI 36.87 kg/m²        Physical Exam     Physical Exam  Vitals and nursing note reviewed.   Constitutional:       Appearance: Normal appearance.  She is normal weight.   HENT:      Head: Normocephalic and atraumatic.      Right Ear: Tympanic membrane, ear canal and external ear normal.      Left Ear: Tympanic membrane, ear canal and external ear normal.      Nose: Congestion and rhinorrhea present.      Comments: Boggy mucosa  max sinus tender to palp      Mouth/Throat:      Pharynx: Posterior oropharyngeal erythema present.   Cardiovascular:      Rate and Rhythm: Normal rate and regular rhythm.      Pulses: Normal pulses.      Heart sounds: Normal heart sounds.   Pulmonary:      Effort: Pulmonary effort is normal.      Breath sounds: Normal breath sounds.   Abdominal:      General: Bowel sounds are normal.      Palpations: Abdomen is soft.   Musculoskeletal:         General: Normal range of motion.      Cervical back: Normal range of motion and neck supple.   Skin:     General: Skin is warm.      Capillary Refill: Capillary refill takes less than 2 seconds.   Neurological:      Mental Status: She is alert and oriented to person, place, and time.   Psychiatric:         Mood and Affect: Mood normal.

## 2024-11-07 DIAGNOSIS — E03.9 ACQUIRED HYPOTHYROIDISM: ICD-10-CM

## 2024-11-08 RX ORDER — LEVOTHYROXINE SODIUM 50 UG/1
50 TABLET ORAL DAILY
Qty: 90 TABLET | Refills: 0 | Status: SHIPPED | OUTPATIENT
Start: 2024-11-08

## 2024-12-18 ENCOUNTER — ANNUAL EXAM (OUTPATIENT)
Dept: OBGYN CLINIC | Facility: CLINIC | Age: 54
End: 2024-12-18
Payer: COMMERCIAL

## 2024-12-18 VITALS
HEIGHT: 60 IN | SYSTOLIC BLOOD PRESSURE: 116 MMHG | DIASTOLIC BLOOD PRESSURE: 74 MMHG | WEIGHT: 186 LBS | BODY MASS INDEX: 36.52 KG/M2

## 2024-12-18 DIAGNOSIS — Z12.31 ENCOUNTER FOR SCREENING MAMMOGRAM FOR BREAST CANCER: ICD-10-CM

## 2024-12-18 DIAGNOSIS — Z01.419 ENCOUNTER FOR GYNECOLOGICAL EXAMINATION WITHOUT ABNORMAL FINDING: Primary | ICD-10-CM

## 2024-12-18 PROCEDURE — S0612 ANNUAL GYNECOLOGICAL EXAMINA: HCPCS | Performed by: ADVANCED PRACTICE MIDWIFE

## 2024-12-18 NOTE — PROGRESS NOTES
Name: Kelsey Taveras      : 1970      MRN: [id number]  Encounter Provider: Jeri Menjivar CNM  Encounter Date: 2024   Encounter department: [facility]  :  Assessment & Plan  Encounter for gynecological examination without abnormal finding  - Routine well woman exam completed today.  - Cervical Cancer Screening: Current ASCCP Guidelines reviewed. Last Pap: 2023 normal. Next Pap Due:   - HPV Vaccination status: Not immunized  - STI screening offered including HIV: not indicated based on hx or requested at time of visit  - Breast Cancer Screening: Last Mammogram 2023, script given  - Colorectal cancer screening was not ordered.  - The following were reviewed in today's visit: breast self exam, mammography screening ordered, menopause, adequate intake of calcium and vitamin D, and exercise   - RTO 1 yr    Orders:  •  Mammo screening bilateral w 3d and cad; Future    Encounter for screening mammogram for breast cancer    Orders:  •  Mammo screening bilateral w 3d and cad; Future        History of Present Illness   Kelsey presents today for gyn exam. No vaginal bleeding since onset of menopause- this is the first year no period.  last pap smear- normal, Hx of abnormal pap smear - no. Sexually active- no. Does  not desire STI testing.  2023 mammogram- normal, and 2023 colonoscopy- repeat 3 yrs. Reports 8 hrs of sleep daily, 1 servings of calcium rich foods daily. Exercises: no routine per week. 1 servings of caffeine daily. Breast changes - none. Safe at home- yes.  Concerns: none.     Kelsey Taveras is a 53 y.o. female who presents for annual exam      Review of Systems   Constitutional:  Negative for chills, fatigue and fever.   Respiratory:  Negative for cough and shortness of breath.    Cardiovascular:  Negative for chest pain, palpitations and leg swelling.   Gastrointestinal:  Negative for constipation and diarrhea.   Genitourinary:  Negative for  difficulty urinating, dysuria, frequency, pelvic pain, urgency, vaginal bleeding, vaginal discharge and vaginal pain.   Musculoskeletal:  Positive for back pain.   Neurological:  Negative for light-headedness and headaches.   Psychiatric/Behavioral:  Negative for self-injury. The patient is not nervous/anxious.      Medical History Reviewed by provider this encounter:  Tobacco  Allergies  Meds  Problems  Med Hx  Surg Hx  Fam Hx     .  Current Outpatient Medications on File Prior to Visit   Medication Sig Dispense Refill   • cholecalciferol (VITAMIN D3) 1,000 units tablet Take 1,000 Units by mouth daily     • ibuprofen (MOTRIN) 200 mg tablet Take by mouth every 6 (six) hours as needed for mild pain     • levothyroxine 50 mcg tablet Take 1 tablet by mouth once daily 90 tablet 0   • Multiple Vitamin (Multi-Vitamin Daily) TABS Take by mouth     • omega-3-acid ethyl esters (LOVAZA) 1 g capsule Take 2 g by mouth 2 (two) times a day       No current facility-administered medications on file prior to visit.      Social History     Tobacco Use   • Smoking status: Former     Current packs/day: 0.00     Types: Cigarettes     Quit date: 2007     Years since quittin.2   • Smokeless tobacco: Never   Vaping Use   • Vaping status: Never Used   Substance and Sexual Activity   • Alcohol use: Yes     Comment: special occassions   • Drug use: No   • Sexual activity: Not Currently        Objective   /74   Ht 5' (1.524 m)   Wt 84.4 kg (186 lb)   LMP  (LMP Unknown)   BMI 36.33 kg/m²      Physical Exam  Vitals reviewed.   Constitutional:       Appearance: Normal appearance.   Neck:      Thyroid: No thyroid mass or thyroid tenderness.   Cardiovascular:      Rate and Rhythm: Normal rate.   Pulmonary:      Effort: Pulmonary effort is normal.   Chest:   Breasts:     Right: No mass, nipple discharge, skin change or tenderness.      Left: No mass, nipple discharge, skin change or tenderness.   Abdominal:       Tenderness: There is no abdominal tenderness. There is no guarding or rebound.   Genitourinary:     General: Normal vulva.      Exam position: Lithotomy position.      Labia:         Right: No rash, tenderness or lesion.         Left: No rash, tenderness or lesion.       Urethra: No urethral pain, urethral swelling or urethral lesion.      Vagina: No vaginal discharge, erythema, tenderness, bleeding or lesions.      Cervix: No cervical motion tenderness, discharge, friability, lesion or erythema.      Uterus: Normal. Not enlarged and not tender.       Adnexa:         Right: No mass, tenderness or fullness.          Left: No mass, tenderness or fullness.     Lymphadenopathy:      Upper Body:      Right upper body: No axillary adenopathy.      Left upper body: No axillary adenopathy.   Neurological:      Mental Status: She is alert and oriented to person, place, and time.   Psychiatric:         Mood and Affect: Mood normal.         Behavior: Behavior normal.

## 2025-02-10 DIAGNOSIS — E03.9 ACQUIRED HYPOTHYROIDISM: ICD-10-CM

## 2025-02-11 RX ORDER — LEVOTHYROXINE SODIUM 50 UG/1
50 TABLET ORAL DAILY
Qty: 30 TABLET | Refills: 0 | Status: SHIPPED | OUTPATIENT
Start: 2025-02-11

## 2025-02-26 ENCOUNTER — OFFICE VISIT (OUTPATIENT)
Dept: FAMILY MEDICINE CLINIC | Facility: CLINIC | Age: 55
End: 2025-02-26
Payer: COMMERCIAL

## 2025-02-26 VITALS
WEIGHT: 183.86 LBS | DIASTOLIC BLOOD PRESSURE: 70 MMHG | TEMPERATURE: 98.2 F | SYSTOLIC BLOOD PRESSURE: 131 MMHG | BODY MASS INDEX: 35.91 KG/M2 | HEART RATE: 73 BPM | OXYGEN SATURATION: 99 %

## 2025-02-26 DIAGNOSIS — Z00.00 ANNUAL PHYSICAL EXAM: Primary | ICD-10-CM

## 2025-02-26 DIAGNOSIS — E55.9 VITAMIN D DEFICIENCY: ICD-10-CM

## 2025-02-26 DIAGNOSIS — Z80.0 FAMILY HISTORY OF COLON CANCER IN MOTHER: ICD-10-CM

## 2025-02-26 DIAGNOSIS — E03.9 ACQUIRED HYPOTHYROIDISM: ICD-10-CM

## 2025-02-26 DIAGNOSIS — M76.61 ACHILLES TENDINITIS OF BOTH LOWER EXTREMITIES: ICD-10-CM

## 2025-02-26 DIAGNOSIS — Z12.31 ENCOUNTER FOR SCREENING MAMMOGRAM FOR BREAST CANCER: ICD-10-CM

## 2025-02-26 DIAGNOSIS — M76.62 ACHILLES TENDINITIS OF BOTH LOWER EXTREMITIES: ICD-10-CM

## 2025-02-26 DIAGNOSIS — Z82.49 FAMILY HISTORY OF EARLY CAD: ICD-10-CM

## 2025-02-26 DIAGNOSIS — E66.01 SEVERE OBESITY (BMI 35.0-39.9) WITH COMORBIDITY (HCC): ICD-10-CM

## 2025-02-26 DIAGNOSIS — Z11.4 SCREENING FOR HIV (HUMAN IMMUNODEFICIENCY VIRUS): ICD-10-CM

## 2025-02-26 DIAGNOSIS — Z76.89 ENCOUNTER TO ESTABLISH CARE: ICD-10-CM

## 2025-02-26 DIAGNOSIS — Z11.59 NEED FOR HEPATITIS C SCREENING TEST: ICD-10-CM

## 2025-02-26 PROCEDURE — 99214 OFFICE O/P EST MOD 30 MIN: CPT | Performed by: PHYSICIAN ASSISTANT

## 2025-02-26 PROCEDURE — 99396 PREV VISIT EST AGE 40-64: CPT | Performed by: PHYSICIAN ASSISTANT

## 2025-02-26 NOTE — ASSESSMENT & PLAN NOTE
Patient's mother continues to undergo treatment for her colon cancer.  Patient's mother was diagnosed with colon cancer several years ago.  The patient underwent Cologuard testing in 2023.  Unfortunately, she would not be considered a low risk individual and colonoscopy would be the screening method of choice.  I did give her an option to think about whether she would want to undergo a colonoscopy because of her increased risk based on her family history.

## 2025-02-26 NOTE — ASSESSMENT & PLAN NOTE
Patient has discomfort in both of her heels and this is worse when she stands for long periods of time.  She is going to purchase a heel cup for both shoes and see if this helps to shorten the Achilles tendon insertion site.

## 2025-02-26 NOTE — PROGRESS NOTES
Name: Kelsey Taveras      : 1970      MRN: [id number]  Encounter Provider: Jaclyn Gomez PA-C  Encounter Date: 2025   Encounter department: [facility]  :  Assessment & Plan  Annual physical exam  Patient was overdue for her annual physical and asked for an appointment to discuss healthcare concerns and she was willing to have her annual exam done as part of today's visit.  She has not had a lipid profile done since  and her last TSH was May 2023.  She previously was a patient in [facility] with the nurse practitioner at that facility.  She wishes to establish care at this facility.       Need for hepatitis C screening test  She was agreeable to having the one-time screening for hepatitis C  Orders:  •  Hepatitis C Antibody; Future    Screening for HIV (human immunodeficiency virus)  She was agreeable to having the one-time screening for HIV.  Orders:  •  HIV 1/2 AG/AB w Reflex [facility] for 2 yr old and above; Future    Acquired hypothyroidism  Patient is on 50 mcg of levothyroxine.  She was taking it appropriately in the middle of the night.  She has had an increase in her levothyroxine from 25 to 50 mcg.  We will recheck her TSH to see if her dosing is appropriate.  Orders:  •  TSH, 3rd generation with Free T4 reflex; Future  •  Lipid panel; Future    Vitamin D deficiency  Last vitamin D level was low at 25.8.  She continues with vitamin D supplement Tatian.  She is going to try to get 10 minutes of sun exposure when the weather clears.       Family history of early CAD  Patient's father  of an acute myocardial infarction in .       Encounter for screening mammogram for breast cancer  Patient was unaware that she had a previous mammogram order and new order was written for her today.  Orders:  •  Mammo screening bilateral w 3d and cad; Future    Severe obesity (BMI 35.0-39.9) with comorbidity (HCC)  Patient has lost 8 pounds since  November on her own making better food choices.  She is active at her job at [employer] in the area of [facility] and spends the majority of her time either standing or walking.         Achilles tendinitis of both lower extremities  Patient has discomfort in both of her heels and this is worse when she stands for long periods of time.  She is going to purchase a heel cup for both shoes and see if this helps to shorten the Achilles tendon insertion site.       Encounter to establish care  Establishing care as part of today's visit.       Family history of colon cancer in mother  Patient's mother continues to undergo treatment for her colon cancer.  Patient's mother was diagnosed with colon cancer several years ago.  The patient underwent Cologuard testing in 2023.  Unfortunately, she would not be considered a low risk individual and colonoscopy would be the screening method of choice.  I did give her an option to think about whether she would want to undergo a colonoscopy because of her increased risk based on her family history.              History of Present Illness   HPI: 54-year-old female establishing care.  Overall, she has adopted a healthier lifestyle.  She has lost 8 pounds since November.  She is continuing to make healthier food choices.  She does have a craving for something sweet after she finishes dinner and we talked about her getting up and brushing her teeth to see if this helps to control her sugar cravings.    We had a long discussion with regard to appropriate screening for colon cancer.  She has a first-degree relative and her mother having colon cancer.  Colonoscopy is considered the gold standard in the setting and patient is considering whether she would like to have this done given that she had a Cologuard negative test in November 2023.    Patient is now officially menopausal as she has not had her menses for 1 year.  She has some mild problems with concentration and very mild hot flashes which  she is tolerating.  At this point, she is not asking for any additional therapy.    Patient's father  at a young age due to an acute cardiovascular event.  Her last lipid profile was in .  I performed a 10-year risk score on those numbers and she had a 1.2% ASCVD risk.  I did suggest that she could wait until her [facility] part of care now would have the lipid profile done but patient would like to have an answer sooner and not wait until that time.    Patient is willing to get a mammogram.  She did not know that the mammogram has been ordered.  She is going to use heel cups for her Achilles tendinitis.  She has a right TMJ click and she is going to try an over-the-counter mouthguard to help with any bruxism.  Review of Systems: No recent URI or viral syndrome.    Objective   /70 (BP Location: Right arm, Patient Position: Sitting, Cuff Size: Standard)   Pulse 73   Temp 98.2 °F (36.8 °C) (Tympanic)   Wt 83.4 kg (183 lb 13.8 oz)   LMP  (LMP Unknown)   SpO2 99%   BMI 35.91 kg/m²      Physical Exam: 54-year-old female establishing care.  She is normotensive and afebrile.    Well-developed well-nourished 54 y.o. year old female who is cooperative with the exam.  Patient is alert and oriented x3.  Patient is appropriate in answering all questions.    HEENT:  Normocephalic.  PERRLA.  EOMs intact.  TMs are clear with identification of bony landmarks.  No tragus or pinnae tenderness.  No pre or posterior auricular adenopathy.  Sinuses without tenderness.  Throat without hyperemia.  Neck:  Supple without adenopathy.  Thyroid midline without thyromegaly.  Burgos test did not lateral rise.  Andreina test showed AC greater than BC bilaterally.  She has a click upon opening and closing her mouth on the right TMJ chest symmetric and nontender.  Heart regular rate and rhythm.  No murmur rubs or gallops.  Point of maximum impulse not displaced.  Lungs are clear to auscultation.  Breathing is nonlabored.   Aerating bases well.  Abdomen round and soft positive bowel sounds without masses tenderness or organomegaly.  Extremities reveal adequate peripheral pulses without peripheral edema.  Administrative Statements   I have spent a total time of 50 minutes in caring for this patient on the day of the visit/encounter including Diagnostic results, Prognosis, Risks and benefits of tx options, Instructions for management, Patient and family education, Risk factor reductions, Impressions, Counseling / Coordination of care, Documenting in the medical record, Reviewing/placing orders in the medical record (including tests, medications, and/or procedures), and Obtaining or reviewing history      I will contact the patient when I get the results of her TSH and lipid profile.  I will see her in 1 year for her annual exam.  She is going to continue with her vitamin D supplement patient.    BMI is above normal. Nutrition recommendations include reducing portion sizes, decreasing overall calorie intake, and consuming healthier snacks. Exercise recommendations include moderate aerobic physical activity for 150 minutes/week. .

## 2025-02-26 NOTE — ASSESSMENT & PLAN NOTE
Patient has lost 8 pounds since November on her own making better food choices.  She is active at her job at [employer] in the area of [facility] and spends the majority of her time either standing or walking.

## 2025-02-26 NOTE — ASSESSMENT & PLAN NOTE
Patient is on 50 mcg of levothyroxine.  She was taking it appropriately in the middle of the night.  She has had an increase in her levothyroxine from 25 to 50 mcg.  We will recheck her TSH to see if her dosing is appropriate.  Orders:  •  TSH, 3rd generation with Free T4 reflex; Future  •  Lipid panel; Future

## 2025-02-27 ENCOUNTER — APPOINTMENT (OUTPATIENT)
Dept: LAB | Facility: HOSPITAL | Age: 55
End: 2025-02-27
Payer: COMMERCIAL

## 2025-02-27 DIAGNOSIS — Z11.4 SCREENING FOR HIV (HUMAN IMMUNODEFICIENCY VIRUS): ICD-10-CM

## 2025-02-27 DIAGNOSIS — E03.9 ACQUIRED HYPOTHYROIDISM: ICD-10-CM

## 2025-02-27 DIAGNOSIS — Z11.59 NEED FOR HEPATITIS C SCREENING TEST: ICD-10-CM

## 2025-02-27 LAB
CHOLEST SERPL-MCNC: 173 MG/DL (ref ?–200)
HCV AB SER QL: NORMAL
HDLC SERPL-MCNC: 50 MG/DL
HIV 1+2 AB+HIV1 P24 AG SERPL QL IA: NORMAL
LDLC SERPL CALC-MCNC: 99 MG/DL (ref 0–100)
NONHDLC SERPL-MCNC: 123 MG/DL
T4 FREE SERPL-MCNC: 0.92 NG/DL (ref 0.61–1.12)
TRIGL SERPL-MCNC: 120 MG/DL (ref ?–150)
TSH SERPL DL<=0.05 MIU/L-ACNC: 4.78 UIU/ML (ref 0.45–4.5)

## 2025-02-27 PROCEDURE — 84439 ASSAY OF FREE THYROXINE: CPT

## 2025-02-27 PROCEDURE — 86803 HEPATITIS C AB TEST: CPT

## 2025-02-27 PROCEDURE — 87389 HIV-1 AG W/HIV-1&-2 AB AG IA: CPT

## 2025-02-27 PROCEDURE — 36415 COLL VENOUS BLD VENIPUNCTURE: CPT

## 2025-02-27 PROCEDURE — 80061 LIPID PANEL: CPT

## 2025-02-27 PROCEDURE — 84443 ASSAY THYROID STIM HORMONE: CPT

## 2025-02-28 ENCOUNTER — RESULTS FOLLOW-UP (OUTPATIENT)
Dept: FAMILY MEDICINE CLINIC | Facility: CLINIC | Age: 55
End: 2025-02-28

## 2025-03-07 ENCOUNTER — TELEPHONE (OUTPATIENT)
Dept: FAMILY MEDICINE CLINIC | Facility: CLINIC | Age: 55
End: 2025-03-07

## 2025-03-09 DIAGNOSIS — E03.9 ACQUIRED HYPOTHYROIDISM: ICD-10-CM

## 2025-03-12 ENCOUNTER — TELEPHONE (OUTPATIENT)
Age: 55
End: 2025-03-12

## 2025-03-12 DIAGNOSIS — E03.9 ACQUIRED HYPOTHYROIDISM: ICD-10-CM

## 2025-03-12 RX ORDER — LEVOTHYROXINE SODIUM 50 UG/1
50 TABLET ORAL DAILY
Qty: 30 TABLET | Refills: 0 | OUTPATIENT
Start: 2025-03-12

## 2025-03-12 RX ORDER — LEVOTHYROXINE SODIUM 50 UG/1
50 TABLET ORAL DAILY
Qty: 90 TABLET | Refills: 3 | Status: SHIPPED | OUTPATIENT
Start: 2025-03-12

## 2025-03-12 NOTE — TELEPHONE ENCOUNTER
Received call from patient who inquired about talk therapy services for employees. Writer offered [facility] and [facility]. Patient agreed to be added to both wait lists.    Preferences:  Virtual Preferred  Female Provider Preferred      Additional Resource Guide - Request  Email: [email or web address]

## 2025-03-14 DIAGNOSIS — Z00.6 ENCOUNTER FOR EXAMINATION FOR NORMAL COMPARISON OR CONTROL IN CLINICAL RESEARCH PROGRAM: ICD-10-CM

## 2025-03-19 ENCOUNTER — TELEPHONE (OUTPATIENT)
Dept: PSYCHIATRY | Facility: CLINIC | Age: 55
End: 2025-03-19

## 2025-03-27 ENCOUNTER — APPOINTMENT (OUTPATIENT)
Dept: LAB | Facility: HOSPITAL | Age: 55
End: 2025-03-27

## 2025-03-27 DIAGNOSIS — Z00.6 ENCOUNTER FOR EXAMINATION FOR NORMAL COMPARISON OR CONTROL IN CLINICAL RESEARCH PROGRAM: ICD-10-CM

## 2025-03-27 PROCEDURE — 36415 COLL VENOUS BLD VENIPUNCTURE: CPT

## 2025-04-11 DIAGNOSIS — E03.9 ACQUIRED HYPOTHYROIDISM: ICD-10-CM

## 2025-04-11 LAB
APOB+LDLR+PCSK9 GENE MUT ANL BLD/T: NOT DETECTED
BRCA1+BRCA2 DEL+DUP + FULL MUT ANL BLD/T: NOT DETECTED
MLH1+MSH2+MSH6+PMS2 GN DEL+DUP+FUL M: NOT DETECTED

## 2025-04-11 RX ORDER — LEVOTHYROXINE SODIUM 50 UG/1
50 TABLET ORAL DAILY
Qty: 90 TABLET | Refills: 3 | Status: SHIPPED | OUTPATIENT
Start: 2025-04-11

## 2025-05-04 ENCOUNTER — HOSPITAL ENCOUNTER (EMERGENCY)
Facility: HOSPITAL | Age: 55
Discharge: HOME | End: 2025-05-04
Attending: EMERGENCY MEDICINE | Admitting: EMERGENCY MEDICINE
Payer: COMMERCIAL

## 2025-05-04 VITALS
HEART RATE: 71 BPM | RESPIRATION RATE: 18 BRPM | WEIGHT: 183 LBS | SYSTOLIC BLOOD PRESSURE: 136 MMHG | DIASTOLIC BLOOD PRESSURE: 84 MMHG | TEMPERATURE: 98.2 F | BODY MASS INDEX: 35.93 KG/M2 | OXYGEN SATURATION: 99 % | HEIGHT: 60 IN

## 2025-05-04 DIAGNOSIS — S61.211A LACERATION OF LEFT INDEX FINGER WITHOUT FOREIGN BODY WITHOUT DAMAGE TO NAIL, INITIAL ENCOUNTER: Primary | ICD-10-CM

## 2025-05-04 PROCEDURE — 90471 IMMUNIZATION ADMIN: CPT

## 2025-05-04 PROCEDURE — 99282 EMERGENCY DEPT VISIT SF MDM: CPT

## 2025-05-04 PROCEDURE — 90715 TDAP VACCINE 7 YRS/> IM: CPT | Performed by: PHYSICIAN ASSISTANT

## 2025-05-04 RX ORDER — LIDOCAINE HYDROCHLORIDE 10 MG/ML
10 INJECTION, SOLUTION EPIDURAL; INFILTRATION; INTRACAUDAL; PERINEURAL ONCE
Status: COMPLETED | OUTPATIENT
Start: 2025-05-04 | End: 2025-05-04

## 2025-05-04 RX ADMIN — LIDOCAINE HYDROCHLORIDE 10 ML: 10 INJECTION, SOLUTION EPIDURAL; INFILTRATION; INTRACAUDAL; PERINEURAL at 18:08

## 2025-05-04 RX ADMIN — TETANUS TOXOID, REDUCED DIPHTHERIA TOXOID AND ACELLULAR PERTUSSIS VACCINE, ADSORBED 0.5 ML: 5; 2.5; 8; 8; 2.5 SUSPENSION INTRAMUSCULAR at 18:08

## 2025-05-04 RX ADMIN — AMOXICILLIN AND CLAVULANATE POTASSIUM 1 TABLET: 875; 125 TABLET, COATED ORAL at 18:08

## 2025-05-04 NOTE — DISCHARGE INSTRUCTIONS
You had 3 staples/stitches placed in your left index You can have these removed in 7 days.  Please keep the wound clean and dry and dressing intact for 24 hours.  You can remove the dressing and then wash normally daily.  Apply over-the-counter antibiotic cream.  Do not scrub too vigorously or soak the wound you may cause the stitches to rip out prematurely.  If you rip out the stitches prematurely we do not replace them due to risk of infection.  You may have the sutures removed by ER, urgent care or primary care.  Continue antibiotics

## 2025-05-04 NOTE — Clinical Note
Kelsey Taveras was seen and treated in our emergency department on 5/4/2025.                Diagnosis:     Kelsey  is off the rest of the shift today, may return to work on return date.    She may return on this date: 05/07/2025         If you have any questions or concerns, please don't hesitate to call.      Kalpesh Mckeon PA-C    ______________________________           _______________          _______________  Hospital Representative                              Date                                Time

## 2025-05-05 NOTE — ED PROVIDER NOTES
Time reflects when diagnosis was documented in both MDM as applicable and the Disposition within this note       Time User Action Codes Description Comment    5/4/2025  6:45 PM Kalpesh Mckeon Add [S61.541A] Laceration of left index finger without foreign body without damage to nail, initial encounter           ED Disposition       ED Disposition   Discharge    Condition   Stable    Date/Time   Sun May 4, 2025  6:45 PM    Comment   Kelsey Taveras discharge to home/self care.                   Assessment & Plan       Medical Decision Making  The patient is a 54-year-old female who presented today for the concern of laceration to left index finger.  The patient states she was trying to cut butter with a knife at home and accidentally injured her left index finger.  Bleeding is controlled.  This occurred prior to arrival she held pressure and came here.  Patient's last tetanus vaccination was in 2015.     Updated tetanus placed on antibiotics and placed 3 sutures to approximate wound.  Educated to have these removed in 7 days    Risk  Prescription drug management.             Medications   tetanus-diphtheria-acellular pertussis (BOOSTRIX) IM injection 0.5 mL (0.5 mL Intramuscular Given 5/4/25 1808)   amoxicillin-clavulanate (AUGMENTIN) 875-125 mg per tablet 1 tablet (1 tablet Oral Given 5/4/25 1808)   lidocaine (PF) (XYLOCAINE-MPF) 1 % injection 10 mL (10 mL Infiltration Given 5/4/25 1808)       ED Risk Strat Scores                    No data recorded        SBIRT 22yo+      Flowsheet Row Most Recent Value   Initial Alcohol Screen: US AUDIT-C     1. How often do you have a drink containing alcohol? 0 Filed at: 05/04/2025 1745   2. How many drinks containing alcohol do you have on a typical day you are drinking?  0 Filed at: 05/04/2025 1745   3b. FEMALE Any Age, or MALE 65+: How often do you have 4 or more drinks on one occassion? 0 Filed at: 05/04/2025 1745   Audit-C Score 0 Filed at: 05/04/2025 1745   JAJA: How many times  in the past year have you...    Used an illegal drug or used a prescription medication for non-medical reasons? Never Filed at: 2025 [id number]                            History of Present Illness       Chief Complaint   Patient presents with    Finger Laceration     Pt arrives from home with c/o left index finger laceration from a knife.        Past Medical History:   Diagnosis Date    Disease of thyroid gland     Hypothyroidism       Past Surgical History:   Procedure Laterality Date     SECTION        Family History   Problem Relation Age of Onset    Colon cancer Mother     COPD Mother     Heart disease Father     COPD Father     Thyroid disease Sister     Thyroid disease unspecified Sister     Diabetes Sister     No Known Problems Daughter     No Known Problems Maternal Grandmother     No Known Problems Maternal Grandfather     No Known Problems Paternal Grandmother     No Known Problems Paternal Grandfather     No Known Problems Maternal Aunt     No Known Problems Maternal Aunt     No Known Problems Maternal Aunt     No Known Problems Paternal Aunt     No Known Problems Paternal Aunt     Breast cancer Neg Hx     Breast cancer additional onset Neg Hx     Ovarian cancer Neg Hx     BRCA2 Positive Neg Hx     BRCA2 Negative Neg Hx     BRCA1 Positive Neg Hx     BRCA 1/2 Neg Hx     BRCA1 Negative Neg Hx     Endometrial cancer Neg Hx       Social History     Tobacco Use    Smoking status: Former     Current packs/day: 0.00     Types: Cigarettes     Quit date: 2007     Years since quittin.6    Smokeless tobacco: Never   Vaping Use    Vaping status: Never Used   Substance Use Topics    Alcohol use: Yes     Comment: special occassions    Drug use: No      E-Cigarette/Vaping    E-Cigarette Use Never User       E-Cigarette/Vaping Substances      I have reviewed and agree with the history as documented.     The patient is a 54-year-old female who presented today for the concern of laceration to left index  finger.  The patient states she was trying to cut butter with a knife at home and accidentally injured her left index finger.  Bleeding is controlled.  This occurred prior to arrival she held pressure and came here.  Patient's last tetanus vaccination was in 2015.           Review of Systems        Objective       ED Triage Vitals [05/04/25 1737]   Temperature Pulse Blood Pressure Respirations SpO2 Patient Position - Orthostatic VS   98.2 °F (36.8 °C) 71 136/84 18 99 % Sitting      Temp Source Heart Rate Source BP Location FiO2 (%) Pain Score    Temporal Monitor Right arm -- 7      Vitals      Date and Time Temp Pulse SpO2 Resp BP Pain Score FACES Pain Rating User   05/04/25 1737 98.2 °F (36.8 °C) 71 99 % 18 136/84 7 -- MD            Physical Exam  Vitals and nursing note reviewed.   Constitutional:       General: She is not in acute distress.     Appearance: She is well-developed.   HENT:      Head: Normocephalic and atraumatic.   Eyes:      Pupils: Pupils are equal, round, and reactive to light.   Cardiovascular:      Rate and Rhythm: Normal rate.   Pulmonary:      Effort: Pulmonary effort is normal.   Musculoskeletal:      Left hand: Laceration present. Normal range of motion. Normal sensation. There is no disruption of two-point discrimination.        Hands:       Cervical back: Normal range of motion.   Skin:     General: Skin is warm and dry.      Capillary Refill: Capillary refill takes less than 2 seconds.   Neurological:      General: No focal deficit present.      Mental Status: She is alert and oriented to person, place, and time.      Coordination: Coordination normal.   Psychiatric:         Behavior: Behavior normal.         Results Reviewed       None            No orders to display       Universal Protocol:  procedure performed by consultantConsent: Verbal consent obtained.  Risks and benefits: risks, benefits and alternatives were discussed  Consent given by: patient  Patient identity confirmed:  verbally with patient  Laceration repair    Date/Time: 5/4/2025 7:30 PM    Performed by: Kalpesh Mckeon PA-C  Authorized by: Kalpesh Mckeon PA-C  Body area: upper extremity  Location details: left index finger  Laceration length: 2 cm  Foreign bodies: no foreign bodies  Tendon involvement: none  Nerve involvement: none  Anesthesia: digital block    Anesthesia:  Local Anesthetic: lidocaine 1% without epinephrine    Sedation:  Patient sedated: no        Procedure Details:  Preparation: Patient was prepped and draped in the usual sterile fashion.  Irrigation solution: saline  Amount of cleaning: extensive  Debridement: none  Degree of undermining: none  Skin closure: Ethilon  Number of sutures: 3  Technique: simple  Approximation: close  Approximation difficulty: simple  Dressing: gauze roll  Patient tolerance: patient tolerated the procedure well with no immediate complications          ED Medication and Procedure Management   Prior to Admission Medications   Prescriptions Last Dose Informant Patient Reported? Taking?   Multiple Vitamin (Multi-Vitamin Daily) TABS   Yes No   Sig: Take by mouth   cholecalciferol (VITAMIN D3) 1,000 units tablet   Yes No   Sig: Take 1,000 Units by mouth daily   ibuprofen (MOTRIN) 200 mg tablet   Yes No   Sig: Take by mouth every 6 (six) hours as needed for mild pain   levothyroxine 50 mcg tablet   No No   Sig: Take 1 tablet by mouth once daily   omega-3-acid ethyl esters (LOVAZA) 1 g capsule   Yes No   Sig: Take 2 g by mouth 2 (two) times a day      Facility-Administered Medications: None     Discharge Medication List as of 5/4/2025  6:48 PM        START taking these medications    Details   amoxicillin-clavulanate (AUGMENTIN) 875-125 mg per tablet Take 1 tablet by mouth every 12 (twelve) hours for 7 days, Starting Sun 5/4/2025, Until Sun 5/11/2025, Normal           CONTINUE these medications which have NOT CHANGED    Details   cholecalciferol (VITAMIN D3) 1,000 units tablet  Take 1,000 Units by mouth daily, Historical Med      ibuprofen (MOTRIN) 200 mg tablet Take by mouth every 6 (six) hours as needed for mild pain, Historical Med      levothyroxine 50 mcg tablet Take 1 tablet by mouth once daily, Starting Fri 4/11/2025, Normal      Multiple Vitamin (Multi-Vitamin Daily) TABS Take by mouth, Historical Med      omega-3-acid ethyl esters (LOVAZA) 1 g capsule Take 2 g by mouth 2 (two) times a day, Historical Med           No discharge procedures on file.  ED SEPSIS DOCUMENTATION   Time reflects when diagnosis was documented in both MDM as applicable and the Disposition within this note       Time User Action Codes Description Comment    5/4/2025  6:45 PM Kalpesh Mckeon Add [S61.211A] Laceration of left index finger without foreign body without damage to nail, initial encounter                  Kalpesh Mckeon PA-C  05/04/25 2043

## 2025-05-11 ENCOUNTER — OFFICE VISIT (OUTPATIENT)
Dept: URGENT CARE | Facility: CLINIC | Age: 55
End: 2025-05-11

## 2025-05-11 VITALS
HEART RATE: 67 BPM | BODY MASS INDEX: 35.93 KG/M2 | WEIGHT: 183 LBS | RESPIRATION RATE: 18 BRPM | HEIGHT: 60 IN | TEMPERATURE: 96.7 F | SYSTOLIC BLOOD PRESSURE: 126 MMHG | OXYGEN SATURATION: 98 % | DIASTOLIC BLOOD PRESSURE: 68 MMHG

## 2025-05-11 DIAGNOSIS — Z48.02 ENCOUNTER FOR REMOVAL OF SUTURES: Primary | ICD-10-CM

## 2025-05-11 PROCEDURE — 99213 OFFICE O/P EST LOW 20 MIN: CPT

## 2025-05-11 NOTE — PROGRESS NOTES
[facility]        NAME: Kelsey Taveras is a 54 y.o. female  : 1970    MRN: [id number]  DATE: May 11, 2025  TIME: 11:13 AM    Assessment and Plan   Encounter for removal of sutures [Z48.02]  1. Encounter for removal of sutures          3 sutures removed without complications.     Patient Instructions     Keep clean and dry   Monitor for worsening signs of infection  Continue antibiotics as previously prescribed  Follow up with PCP in 3-5 days.  Proceed to  ER if symptoms worsen.    If tests are performed, our office will contact you with results only if changes need to made to the care plan discussed with you at the visit. You can review your full results on [facility].    Chief Complaint     Chief Complaint   Patient presents with    Suture / Staple Removal     Pt is here for suture removal on her left second finger. Pt states the area is still sore and she has 1 day left of her antibiotic.          History of Present Illness       Patient is presenting for suture removal of her left index finger.  She has sutures placed on .  There were 3 sutures placed at that time in the ER.  Her Tdap was updated in the ER and she was placed on Augmentin to prevent infection.  She denies any increased redness, swelling, drainage, fevers, chills, or flulike symptoms.  She does endorse some numbness right around the site where the stitches are but otherwise no other signs of infection.  She is been tolerating the antibiotic well.      Review of Systems   Review of Systems   Constitutional: Negative.    Respiratory: Negative.     Cardiovascular: Negative.    Skin:  Positive for wound (L index finger with 3 sutures).         Current Medications       Current Outpatient Medications:     amoxicillin-clavulanate (AUGMENTIN) 875-125 mg per tablet, Take 1 tablet by mouth every 12 (twelve) hours for 7 days, Disp: 14 tablet, Rfl: 0    cholecalciferol (VITAMIN D3) 1,000 units tablet, Take 1,000 Units by mouth  daily, Disp: , Rfl:     ibuprofen (MOTRIN) 200 mg tablet, Take by mouth every 6 (six) hours as needed for mild pain, Disp: , Rfl:     levothyroxine 50 mcg tablet, Take 1 tablet by mouth once daily, Disp: 90 tablet, Rfl: 3    Multiple Vitamin (Multi-Vitamin Daily) TABS, Take by mouth, Disp: , Rfl:     omega-3-acid ethyl esters (LOVAZA) 1 g capsule, Take 2 g by mouth 2 (two) times a day, Disp: , Rfl:     Current Allergies     Allergies as of 2025    (No Known Allergies)            The following portions of the patient's history were reviewed and updated as appropriate: allergies, current medications, past family history, past medical history, past social history, past surgical history and problem list.     Past Medical History:   Diagnosis Date    Disease of thyroid gland     Hypothyroidism        Past Surgical History:   Procedure Laterality Date     SECTION         Family History   Problem Relation Age of Onset    Colon cancer Mother     COPD Mother     Heart disease Father     COPD Father     Thyroid disease Sister     Thyroid disease unspecified Sister     Diabetes Sister     No Known Problems Daughter     No Known Problems Maternal Grandmother     No Known Problems Maternal Grandfather     No Known Problems Paternal Grandmother     No Known Problems Paternal Grandfather     No Known Problems Maternal Aunt     No Known Problems Maternal Aunt     No Known Problems Maternal Aunt     No Known Problems Paternal Aunt     No Known Problems Paternal Aunt     Breast cancer Neg Hx     Breast cancer additional onset Neg Hx     Ovarian cancer Neg Hx     BRCA2 Positive Neg Hx     BRCA2 Negative Neg Hx     BRCA1 Positive Neg Hx     BRCA 1/2 Neg Hx     BRCA1 Negative Neg Hx     Endometrial cancer Neg Hx          Medications have been verified.        Objective   /68   Pulse 67   Temp (!) 96.7 °F (35.9 °C)   Resp 18   Ht 5' (1.524 m)   Wt 83 kg (183 lb)   LMP  (LMP Unknown)   SpO2 98%   BMI 35.74  kg/m²        Physical Exam     Physical Exam  Constitutional:       Appearance: Normal appearance.   Cardiovascular:      Rate and Rhythm: Normal rate.      Pulses: Normal pulses.   Pulmonary:      Effort: Pulmonary effort is normal.   Musculoskeletal:         General: No swelling or tenderness.   Skin:     General: Skin is warm and dry.      Capillary Refill: Capillary refill takes less than 2 seconds.      Findings: Lesion (L index finger with 3 sutures clean, dry and intact) present.   Neurological:      General: No focal deficit present.      Mental Status: She is alert and oriented to person, place, and time. Mental status is at baseline.

## 2025-05-11 NOTE — LETTER
May 11, 2025     Patient: Kelsey Taveras   YOB: 1970   Date of Visit: 5/11/2025       To Whom It May Concern:    It is my medical opinion that Kelsey Taveras may return to work on 5/12/2025 without restrictions .    If you have any questions or concerns, please don't hesitate to call.         Sincerely,        Mc Shankar PA-C    CC: No Recipients

## 2025-05-11 NOTE — PATIENT INSTRUCTIONS
Keep clean and dry   Monitor for worsening signs of infection  Continue antibiotics as previously prescribed  Follow up with PCP in 3-5 days.  Proceed to  ER if symptoms worsen.    If tests are performed, our office will contact you with results only if changes need to made to the care plan discussed with you at the visit. You can review your full results on [facility].

## 2025-06-17 ENCOUNTER — TELEPHONE (OUTPATIENT)
Dept: FAMILY MEDICINE CLINIC | Facility: CLINIC | Age: 55
End: 2025-06-17

## 2025-06-17 NOTE — TELEPHONE ENCOUNTER
Patient is currently established with [facility] kevon hoskins PA-C    Please remove Kelsey as pcp

## 2025-06-27 NOTE — TELEPHONE ENCOUNTER
06/26/25 10:27 PM     The office's request has been received and reviewed.     Current office is to call the newly established / [facility] PCP  to update PCP field appropriately.     This message will now be completed.    Any additional questions or concerns should be sent to the Practice Liaisons via the appropriate education email address. Please do not reply via In Basket or Encounter.    Thank you  Leyda Clemente